# Patient Record
Sex: FEMALE | Race: WHITE | NOT HISPANIC OR LATINO | Employment: OTHER | URBAN - METROPOLITAN AREA
[De-identification: names, ages, dates, MRNs, and addresses within clinical notes are randomized per-mention and may not be internally consistent; named-entity substitution may affect disease eponyms.]

---

## 2017-02-01 ENCOUNTER — OFFICE VISIT (OUTPATIENT)
Dept: FAMILY MEDICINE CLINIC | Facility: CLINIC | Age: 57
End: 2017-02-01

## 2017-02-01 VITALS
WEIGHT: 134 LBS | OXYGEN SATURATION: 97 % | SYSTOLIC BLOOD PRESSURE: 120 MMHG | TEMPERATURE: 98.5 F | HEART RATE: 59 BPM | RESPIRATION RATE: 16 BRPM | BODY MASS INDEX: 22.33 KG/M2 | DIASTOLIC BLOOD PRESSURE: 80 MMHG | HEIGHT: 65 IN

## 2017-02-01 DIAGNOSIS — N30.01 ACUTE CYSTITIS WITH HEMATURIA: Primary | ICD-10-CM

## 2017-02-01 LAB
BILIRUB BLD-MCNC: NEGATIVE MG/DL
CLARITY, POC: CLEAR
COLOR UR: ABNORMAL
GLUCOSE UR STRIP-MCNC: NEGATIVE MG/DL
KETONES UR QL: NEGATIVE
LEUKOCYTE EST, POC: ABNORMAL
NITRITE UR-MCNC: NEGATIVE MG/ML
PH UR: 7 [PH] (ref 5–8)
PROT UR STRIP-MCNC: ABNORMAL MG/DL
RBC # UR STRIP: ABNORMAL /UL
SP GR UR: 1 (ref 1–1.03)
UROBILINOGEN UR QL: NORMAL

## 2017-02-01 PROCEDURE — 99213 OFFICE O/P EST LOW 20 MIN: CPT | Performed by: FAMILY MEDICINE

## 2017-02-01 PROCEDURE — 81003 URINALYSIS AUTO W/O SCOPE: CPT | Performed by: FAMILY MEDICINE

## 2017-02-01 RX ORDER — SULFAMETHOXAZOLE AND TRIMETHOPRIM 800; 160 MG/1; MG/1
1 TABLET ORAL 2 TIMES DAILY
Qty: 10 TABLET | Refills: 0 | Status: SHIPPED | OUTPATIENT
Start: 2017-02-01 | End: 2017-03-27

## 2017-02-01 NOTE — PROGRESS NOTES
Subjective   Kasia Carroll is a 56 y.o. female presenting with   Chief Complaint   Patient presents with   • Burning with Urination     pressure feeling with urination         HPI Comments: She noticed a little burning on urination Monday but then as the next 2 days went by she developed increasing pressure sensation and urgency with burning.    She does not have any back pain or high fever or chills or dizziness or nausea or vomiting.       The following portions of the patient's history were reviewed and updated as appropriate: current medications, past family history, past medical history, past social history, past surgical history and problem list.    Review of Systems   Genitourinary: Positive for dysuria and frequency.   All other systems reviewed and are negative.      Objective   Physical Exam   Constitutional: She is oriented to person, place, and time. She appears well-developed and well-nourished. No distress.   HENT:   Head: Normocephalic and atraumatic.   Mouth/Throat: No oropharyngeal exudate.   Eyes: EOM are normal. Pupils are equal, round, and reactive to light. No scleral icterus.   Neck: Normal range of motion. Neck supple. No thyromegaly present.   Abdominal: Soft. Bowel sounds are normal. She exhibits no distension. There is no tenderness.   Musculoskeletal: Normal range of motion. She exhibits no edema or tenderness.   Lymphadenopathy:     She has no cervical adenopathy.   Neurological: She is alert and oriented to person, place, and time.   Skin: Skin is warm and dry.   Psychiatric: She has a normal mood and affect. Her behavior is normal.   Nursing note and vitals reviewed.      Assessment/Plan   Kasia was seen today for burning with urination.    Diagnoses and all orders for this visit:    Acute cystitis with hematuria    Other orders  -     sulfamethoxazole-trimethoprim (BACTRIM DS,SEPTRA DS) 800-160 MG per tablet; Take 1 tablet by mouth 2 (Two) Times a Day.                   I would like  him to return for another visit in 6 month(s)

## 2017-02-03 LAB
BACTERIA UR CULT: NORMAL
BACTERIA UR CULT: NORMAL

## 2017-02-06 ENCOUNTER — TELEPHONE (OUTPATIENT)
Dept: FAMILY MEDICINE CLINIC | Facility: CLINIC | Age: 57
End: 2017-02-06

## 2017-02-06 NOTE — TELEPHONE ENCOUNTER
"Pt called stating she feels like her UTI has not cleared up  she still feels pressure and a \"tad\" of stinging after urination she is asking what should she do   She had a large amount of leukocytes and blood in her urine on 2-1-17  She had a rx for bactrim and finished it all   "

## 2017-02-06 NOTE — TELEPHONE ENCOUNTER
Her urine culture was negative for infection so something else may be going on.  Can leave urine specimen if she wants, if she does order culture also.  Having any vaginal discharge or other symptoms?

## 2017-02-07 ENCOUNTER — CLINICAL SUPPORT (OUTPATIENT)
Dept: FAMILY MEDICINE CLINIC | Facility: CLINIC | Age: 57
End: 2017-02-07

## 2017-02-07 DIAGNOSIS — R30.9 PAINFUL URINATION: Primary | ICD-10-CM

## 2017-02-07 LAB
BILIRUB BLD-MCNC: NEGATIVE MG/DL
CLARITY, POC: CLEAR
COLOR UR: YELLOW
GLUCOSE UR STRIP-MCNC: NEGATIVE MG/DL
KETONES UR QL: NEGATIVE
LEUKOCYTE EST, POC: ABNORMAL
NITRITE UR-MCNC: NEGATIVE MG/ML
PH UR: 6 [PH] (ref 5–8)
PROT UR STRIP-MCNC: NEGATIVE MG/DL
RBC # UR STRIP: NEGATIVE /UL
SP GR UR: 1.01 (ref 1–1.03)
UROBILINOGEN UR QL: NORMAL

## 2017-02-07 PROCEDURE — 81003 URINALYSIS AUTO W/O SCOPE: CPT | Performed by: FAMILY MEDICINE

## 2017-02-09 LAB
BACTERIA UR CULT: NORMAL
BACTERIA UR CULT: NORMAL

## 2017-03-23 DIAGNOSIS — E03.8 OTHER SPECIFIED HYPOTHYROIDISM: Primary | ICD-10-CM

## 2017-03-23 DIAGNOSIS — I10 ESSENTIAL HYPERTENSION: ICD-10-CM

## 2017-03-23 LAB
ALBUMIN SERPL-MCNC: 4.8 G/DL (ref 3.5–5.2)
ALBUMIN/GLOB SERPL: 2.2 G/DL
ALP SERPL-CCNC: 51 U/L (ref 39–117)
ALT SERPL-CCNC: 30 U/L (ref 1–33)
AST SERPL-CCNC: 36 U/L (ref 1–32)
BASOPHILS # BLD AUTO: 0.02 10*3/MM3 (ref 0–0.2)
BASOPHILS NFR BLD AUTO: 0.6 % (ref 0–1.5)
BILIRUB SERPL-MCNC: 0.4 MG/DL (ref 0.1–1.2)
BUN SERPL-MCNC: 4 MG/DL (ref 6–20)
BUN/CREAT SERPL: 7 (ref 7–25)
CALCIUM SERPL-MCNC: 9.7 MG/DL (ref 8.6–10.5)
CHLORIDE SERPL-SCNC: 95 MMOL/L (ref 98–107)
CHOLEST SERPL-MCNC: 265 MG/DL (ref 0–200)
CO2 SERPL-SCNC: 26.3 MMOL/L (ref 22–29)
CREAT SERPL-MCNC: 0.57 MG/DL (ref 0.57–1)
EOSINOPHIL # BLD AUTO: 0.21 10*3/MM3 (ref 0–0.7)
EOSINOPHIL NFR BLD AUTO: 6.1 % (ref 0.3–6.2)
ERYTHROCYTE [DISTWIDTH] IN BLOOD BY AUTOMATED COUNT: 12.4 % (ref 11.7–13)
GLOBULIN SER CALC-MCNC: 2.2 GM/DL
GLUCOSE SERPL-MCNC: 83 MG/DL (ref 65–99)
HCT VFR BLD AUTO: 39.2 % (ref 35.6–45.5)
HDLC SERPL-MCNC: 93 MG/DL (ref 40–60)
HGB BLD-MCNC: 13.3 G/DL (ref 11.9–15.5)
IMM GRANULOCYTES # BLD: 0 10*3/MM3 (ref 0–0.03)
IMM GRANULOCYTES NFR BLD: 0 % (ref 0–0.5)
LDLC SERPL CALC-MCNC: 154 MG/DL (ref 0–100)
LYMPHOCYTES # BLD AUTO: 1.27 10*3/MM3 (ref 0.9–4.8)
LYMPHOCYTES NFR BLD AUTO: 36.6 % (ref 19.6–45.3)
MCH RBC QN AUTO: 33.3 PG (ref 26.9–32)
MCHC RBC AUTO-ENTMCNC: 33.9 G/DL (ref 32.4–36.3)
MCV RBC AUTO: 98 FL (ref 80.5–98.2)
MONOCYTES # BLD AUTO: 0.41 10*3/MM3 (ref 0.2–1.2)
MONOCYTES NFR BLD AUTO: 11.8 % (ref 5–12)
NEUTROPHILS # BLD AUTO: 1.56 10*3/MM3 (ref 1.9–8.1)
NEUTROPHILS NFR BLD AUTO: 44.9 % (ref 42.7–76)
PLATELET # BLD AUTO: 225 10*3/MM3 (ref 140–500)
POTASSIUM SERPL-SCNC: 4 MMOL/L (ref 3.5–5.2)
PROT SERPL-MCNC: 7 G/DL (ref 6–8.5)
RBC # BLD AUTO: 4 10*6/MM3 (ref 3.9–5.2)
SODIUM SERPL-SCNC: 138 MMOL/L (ref 136–145)
TRIGL SERPL-MCNC: 88 MG/DL (ref 0–150)
TSH SERPL DL<=0.005 MIU/L-ACNC: 3.14 MIU/ML (ref 0.27–4.2)
VLDLC SERPL CALC-MCNC: 17.6 MG/DL (ref 5–40)
WBC # BLD AUTO: 3.47 10*3/MM3 (ref 4.5–10.7)

## 2017-03-23 RX ORDER — LEVOTHYROXINE SODIUM 0.07 MG/1
TABLET ORAL
Qty: 90 TABLET | Refills: 0 | Status: SHIPPED | OUTPATIENT
Start: 2017-03-23 | End: 2017-03-27 | Stop reason: SDUPTHER

## 2017-03-23 RX ORDER — AMLODIPINE BESYLATE 5 MG/1
TABLET ORAL
Qty: 90 TABLET | Refills: 0 | Status: SHIPPED | OUTPATIENT
Start: 2017-03-23 | End: 2017-03-27 | Stop reason: SDUPTHER

## 2017-03-27 ENCOUNTER — OFFICE VISIT (OUTPATIENT)
Dept: FAMILY MEDICINE CLINIC | Facility: CLINIC | Age: 57
End: 2017-03-27

## 2017-03-27 VITALS
OXYGEN SATURATION: 97 % | RESPIRATION RATE: 16 BRPM | HEIGHT: 65 IN | BODY MASS INDEX: 22.99 KG/M2 | DIASTOLIC BLOOD PRESSURE: 82 MMHG | WEIGHT: 138 LBS | HEART RATE: 67 BPM | SYSTOLIC BLOOD PRESSURE: 124 MMHG

## 2017-03-27 DIAGNOSIS — E78.2 MIXED HYPERLIPIDEMIA: ICD-10-CM

## 2017-03-27 DIAGNOSIS — I10 ESSENTIAL HYPERTENSION: Primary | ICD-10-CM

## 2017-03-27 DIAGNOSIS — E03.9 HYPOTHYROIDISM (ACQUIRED): ICD-10-CM

## 2017-03-27 PROCEDURE — 99213 OFFICE O/P EST LOW 20 MIN: CPT | Performed by: FAMILY MEDICINE

## 2017-03-27 RX ORDER — LEVOTHYROXINE SODIUM 0.07 MG/1
75 TABLET ORAL DAILY
Qty: 90 TABLET | Refills: 1 | Status: SHIPPED | OUTPATIENT
Start: 2017-03-27 | End: 2017-12-28 | Stop reason: SDUPTHER

## 2017-03-27 RX ORDER — AMLODIPINE BESYLATE 5 MG/1
5 TABLET ORAL DAILY
Qty: 90 TABLET | Refills: 1 | Status: SHIPPED | OUTPATIENT
Start: 2017-03-27 | End: 2017-12-28 | Stop reason: SDUPTHER

## 2017-03-27 NOTE — PROGRESS NOTES
Subjective   Kasia Carroll is a 56 y.o. female presenting with   Chief Complaint   Patient presents with   • Hypertension     check up    • Hypothyroidism     check up    • Labs Only     results from 3-23-17        HPI Comments: 56-year-old  white female nonsmoker here for follow-up for her blood pressure and cholesterol.  She also has a history of acquired hypothyroidism but her TSH is normal so she is on the right dose.  She tells me she is feeling well but she and her  have started eating out more often and instead of vinegar and orally old dressing on her salad she has been getting blue cheese.  Consequently her LDL cholesterol has risen from 122 to 150.  She has a very high HDL at 93 so I told her rather than start her on a cholesterol medicine, I would rather see her try to alter her lifestyle so that she can get the LDL down like it was before    She exercises regularly 5 days a week and 2 days a week does strength training with a .    Hypertension     Hypothyroidism          The following portions of the patient's history were reviewed and updated as appropriate: current medications, past family history, past medical history, past social history, past surgical history and problem list.    Review of Systems   All other systems reviewed and are negative.      Objective   Physical Exam   Constitutional: She is oriented to person, place, and time. She appears well-developed and well-nourished. No distress.   HENT:   Head: Normocephalic and atraumatic.   Eyes: EOM are normal. Pupils are equal, round, and reactive to light.   Neck: Normal range of motion. Neck supple. No thyromegaly present.   Cardiovascular: Normal rate, regular rhythm, normal heart sounds and intact distal pulses.  Exam reveals no friction rub.    No murmur heard.  Pulmonary/Chest: Effort normal and breath sounds normal. No respiratory distress. She has no wheezes. She has no rales.   Musculoskeletal: Normal range  of motion. She exhibits no edema or tenderness.   Lymphadenopathy:     She has no cervical adenopathy.   Neurological: She is alert and oriented to person, place, and time. No cranial nerve deficit. Coordination normal.   Skin: Skin is warm and dry. No rash noted. She is not diaphoretic.   Psychiatric: She has a normal mood and affect. Her behavior is normal.   Vitals reviewed.      Assessment/Plan   Kasia was seen today for hypertension, hypothyroidism and labs only.    Diagnoses and all orders for this visit:    Essential hypertension    Hypothyroidism (acquired)    Mixed hyperlipidemia                   I would like him to return for another visit in 6 month(s)

## 2017-03-27 NOTE — PATIENT INSTRUCTIONS
This is a very nice 56-year-old is here for follow-up.  I would like her to work on her diet to see if she can get her LDL cholesterol back down like it was last year.

## 2017-05-12 ENCOUNTER — CLINICAL SUPPORT (OUTPATIENT)
Dept: FAMILY MEDICINE CLINIC | Facility: CLINIC | Age: 57
End: 2017-05-12

## 2017-05-12 DIAGNOSIS — R35.0 URINARY FREQUENCY: Primary | ICD-10-CM

## 2017-05-12 LAB
BILIRUB BLD-MCNC: NEGATIVE MG/DL
CLARITY, POC: CLEAR
COLOR UR: YELLOW
GLUCOSE UR STRIP-MCNC: NEGATIVE MG/DL
KETONES UR QL: NEGATIVE
LEUKOCYTE EST, POC: NEGATIVE
NITRITE UR-MCNC: NEGATIVE MG/ML
PH UR: 6 [PH] (ref 5–8)
PROT UR STRIP-MCNC: NEGATIVE MG/DL
RBC # UR STRIP: NEGATIVE /UL
SP GR UR: 1.01 (ref 1–1.03)
UROBILINOGEN UR QL: NORMAL

## 2017-05-12 PROCEDURE — 81003 URINALYSIS AUTO W/O SCOPE: CPT | Performed by: FAMILY MEDICINE

## 2017-05-15 ENCOUNTER — OFFICE VISIT (OUTPATIENT)
Dept: FAMILY MEDICINE CLINIC | Facility: CLINIC | Age: 57
End: 2017-05-15

## 2017-05-15 VITALS
WEIGHT: 139 LBS | TEMPERATURE: 98.1 F | HEIGHT: 65 IN | SYSTOLIC BLOOD PRESSURE: 130 MMHG | BODY MASS INDEX: 23.16 KG/M2 | HEART RATE: 93 BPM | OXYGEN SATURATION: 98 % | RESPIRATION RATE: 16 BRPM | DIASTOLIC BLOOD PRESSURE: 82 MMHG

## 2017-05-15 DIAGNOSIS — I10 ESSENTIAL HYPERTENSION: ICD-10-CM

## 2017-05-15 DIAGNOSIS — E78.2 MIXED HYPERLIPIDEMIA: ICD-10-CM

## 2017-05-15 DIAGNOSIS — M25.571 ACUTE RIGHT ANKLE PAIN: ICD-10-CM

## 2017-05-15 DIAGNOSIS — N30.01 ACUTE CYSTITIS WITH HEMATURIA: Primary | ICD-10-CM

## 2017-05-15 LAB
BILIRUB BLD-MCNC: NEGATIVE MG/DL
CLARITY, POC: CLEAR
COLOR UR: YELLOW
GLUCOSE UR STRIP-MCNC: NEGATIVE MG/DL
KETONES UR QL: NEGATIVE
LEUKOCYTE EST, POC: NEGATIVE
NITRITE UR-MCNC: NEGATIVE MG/ML
PH UR: 5.5 [PH] (ref 5–8)
PROT UR STRIP-MCNC: NEGATIVE MG/DL
RBC # UR STRIP: ABNORMAL /UL
SP GR UR: 1 (ref 1–1.03)
UROBILINOGEN UR QL: NORMAL

## 2017-05-15 PROCEDURE — 81003 URINALYSIS AUTO W/O SCOPE: CPT | Performed by: FAMILY MEDICINE

## 2017-05-15 PROCEDURE — 73610 X-RAY EXAM OF ANKLE: CPT | Performed by: FAMILY MEDICINE

## 2017-05-15 PROCEDURE — 99214 OFFICE O/P EST MOD 30 MIN: CPT | Performed by: FAMILY MEDICINE

## 2017-05-15 RX ORDER — FLUCONAZOLE 150 MG/1
150 TABLET ORAL ONCE
Qty: 1 TABLET | Refills: 1 | Status: SHIPPED | OUTPATIENT
Start: 2017-05-15 | End: 2017-05-15

## 2017-05-16 ENCOUNTER — TELEPHONE (OUTPATIENT)
Dept: ORTHOPEDIC SURGERY | Facility: CLINIC | Age: 57
End: 2017-05-16

## 2017-05-18 ENCOUNTER — OFFICE VISIT (OUTPATIENT)
Dept: ORTHOPEDIC SURGERY | Facility: CLINIC | Age: 57
End: 2017-05-18

## 2017-05-18 VITALS — TEMPERATURE: 98 F | HEIGHT: 65 IN | WEIGHT: 139 LBS | BODY MASS INDEX: 23.16 KG/M2

## 2017-05-18 DIAGNOSIS — S93.401A SPRAIN OF RIGHT ANKLE, UNSPECIFIED LIGAMENT, INITIAL ENCOUNTER: Primary | ICD-10-CM

## 2017-05-18 DIAGNOSIS — S82.61XA CLOSED DISPLACED FRACTURE OF LATERAL MALLEOLUS OF RIGHT FIBULA, INITIAL ENCOUNTER: ICD-10-CM

## 2017-05-18 DIAGNOSIS — S86.301A PERONEAL TENDON INJURY, RIGHT, INITIAL ENCOUNTER: ICD-10-CM

## 2017-05-18 PROBLEM — S86.309A PERONEAL TENDON INJURY: Status: ACTIVE | Noted: 2017-05-18

## 2017-05-18 PROCEDURE — 99204 OFFICE O/P NEW MOD 45 MIN: CPT | Performed by: ORTHOPAEDIC SURGERY

## 2017-05-18 PROCEDURE — 27786 TREATMENT OF ANKLE FRACTURE: CPT | Performed by: ORTHOPAEDIC SURGERY

## 2017-05-22 DIAGNOSIS — S86.301A PERONEAL TENDON INJURY, RIGHT, INITIAL ENCOUNTER: Primary | ICD-10-CM

## 2017-05-23 ENCOUNTER — OFFICE VISIT (OUTPATIENT)
Dept: ORTHOPEDIC SURGERY | Facility: CLINIC | Age: 57
End: 2017-05-23

## 2017-05-23 DIAGNOSIS — S86.301A PERONEAL TENDON INJURY, RIGHT, INITIAL ENCOUNTER: ICD-10-CM

## 2017-05-23 PROCEDURE — 73721 MRI JNT OF LWR EXTRE W/O DYE: CPT | Performed by: ORTHOPAEDIC SURGERY

## 2017-05-31 ENCOUNTER — OFFICE VISIT (OUTPATIENT)
Dept: FAMILY MEDICINE CLINIC | Facility: CLINIC | Age: 57
End: 2017-05-31

## 2017-05-31 VITALS
OXYGEN SATURATION: 98 % | SYSTOLIC BLOOD PRESSURE: 120 MMHG | BODY MASS INDEX: 23.16 KG/M2 | TEMPERATURE: 97.9 F | HEART RATE: 78 BPM | HEIGHT: 65 IN | DIASTOLIC BLOOD PRESSURE: 80 MMHG | WEIGHT: 139 LBS

## 2017-05-31 DIAGNOSIS — N30.01 ACUTE CYSTITIS WITH HEMATURIA: Primary | ICD-10-CM

## 2017-05-31 LAB
BILIRUB BLD-MCNC: NEGATIVE MG/DL
CLARITY, POC: ABNORMAL
COLOR UR: ABNORMAL
GLUCOSE UR STRIP-MCNC: NEGATIVE MG/DL
KETONES UR QL: NEGATIVE
LEUKOCYTE EST, POC: ABNORMAL
NITRITE UR-MCNC: NEGATIVE MG/ML
PH UR: 8 [PH] (ref 5–8)
PROT UR STRIP-MCNC: ABNORMAL MG/DL
RBC # UR STRIP: ABNORMAL /UL
SP GR UR: 1.01 (ref 1–1.03)
UROBILINOGEN UR QL: NORMAL

## 2017-05-31 PROCEDURE — 99213 OFFICE O/P EST LOW 20 MIN: CPT | Performed by: FAMILY MEDICINE

## 2017-05-31 PROCEDURE — 81003 URINALYSIS AUTO W/O SCOPE: CPT | Performed by: FAMILY MEDICINE

## 2017-05-31 RX ORDER — SULFAMETHOXAZOLE AND TRIMETHOPRIM 800; 160 MG/1; MG/1
1 TABLET ORAL 2 TIMES DAILY
Qty: 14 TABLET | Refills: 0 | Status: SHIPPED | OUTPATIENT
Start: 2017-05-31 | End: 2017-06-30

## 2017-06-02 ENCOUNTER — OFFICE VISIT (OUTPATIENT)
Dept: ORTHOPEDIC SURGERY | Facility: CLINIC | Age: 57
End: 2017-06-02

## 2017-06-02 VITALS — HEIGHT: 64 IN | WEIGHT: 135 LBS | TEMPERATURE: 98.4 F | BODY MASS INDEX: 23.05 KG/M2

## 2017-06-02 DIAGNOSIS — S86.301D PERONEAL TENDON INJURY, RIGHT, SUBSEQUENT ENCOUNTER: ICD-10-CM

## 2017-06-02 DIAGNOSIS — S93.401D SPRAIN OF RIGHT ANKLE, UNSPECIFIED LIGAMENT, SUBSEQUENT ENCOUNTER: Primary | ICD-10-CM

## 2017-06-02 PROCEDURE — 99024 POSTOP FOLLOW-UP VISIT: CPT | Performed by: ORTHOPAEDIC SURGERY

## 2017-06-02 NOTE — PROGRESS NOTES
"Ankle Follow Up      Patient: Kasia Carroll    YOB: 1960 57 y.o. female    Chief Complaints: Ankle feeling better    History of Present Illness: Follows up injury that occurred to her right ankle on 4/27/17.  Since I saw her on 5/18/17 she's been wearing her ASO brace and has only minimal complaints of mild achy pain in the lateral aspect of the right ankle.  She's not had any feelings of instability and feels like her swelling has improved.  Prior to that she was not having episodes of instability to the ankle but had several prior sprains in the remote past  HPI    ROS: ankle pain  Past Medical History:   Diagnosis Date   • History of postmenopausal HRT 4020-1621   • Hypertension    • Hypothyroidism    • Silicone leakage from breast implant 2008       Physical Exam:   Vitals:    06/02/17 1435   Temp: 98.4 °F (36.9 °C)   Weight: 135 lb (61.2 kg)   Height: 64\" (162.6 cm)     Well developed with normal mood.Nonantalgic gait.  Right ankle shows only mild swelling laterally.  There is minimal discomfort along the peroneal tendons she's able to actively peter this with 5 out of 5 motor strength with no palpable subluxation.  Slight increased anterior drawer on the right compared to the left but no sulcus.  Nontender medially      Radiology: MRI films and report of the right ankle dated 5/23/17 reviewed shows ATFL disruption with no normal-appearing fibers and also appears that the CFL is avulsed from its fibular attachment.  Peroneal tendon sheath demonstrates effusion but the tendons appeared normal in signal thickness and position was some edema around the superior peroneal retinaculum but does not appear to be frankly disrupted.      Assessment/Plan: Right ankle sprain with peroneal tenosynovitis with possible SPR injury.  We'll area fractures noted on previous x-ray.  Clinically she is doing quite well not having feelings of subluxation of peroneal tendons or cannot provoke this today.  Overall she " is improving I would not recommend surgical treatment for this.  She understands if she develops feelings of instability or demonstrates any peroneal problems may require surgery in the future.  She'll continue with her ASO for the next month she may do some light workout activities with her brace on but no running jumping or cutting or treadmill.  Ministate upper body exercises no planks.  We'll get her into physical therapy upstairs and I will see her back in 4 weeks with x-rays of her right ankle

## 2017-06-04 LAB
BACTERIA UR CULT: ABNORMAL
BACTERIA UR CULT: ABNORMAL
OTHER ANTIBIOTIC SUSC ISLT: ABNORMAL

## 2017-06-12 ENCOUNTER — HOSPITAL ENCOUNTER (OUTPATIENT)
Dept: PHYSICAL THERAPY | Facility: HOSPITAL | Age: 57
Setting detail: THERAPIES SERIES
Discharge: HOME | End: 2017-06-12
Attending: ORTHOPAEDIC SURGERY

## 2017-06-12 DIAGNOSIS — S93.491D: ICD-10-CM

## 2017-06-12 DIAGNOSIS — S93.411D SPRAIN OF CALCANEOFIBULAR LIGAMENT OF RIGHT ANKLE, SUBSEQUENT ENCOUNTER: Primary | ICD-10-CM

## 2017-06-12 PROCEDURE — 97110 THERAPEUTIC EXERCISES: CPT | Performed by: PHYSICAL THERAPIST

## 2017-06-12 PROCEDURE — 97161 PT EVAL LOW COMPLEX 20 MIN: CPT | Performed by: PHYSICAL THERAPIST

## 2017-06-12 NOTE — THERAPY EVALUATION
Outpatient Physical Therapy Ortho Initial Evaluation  [facility]     Patient Name: Kasia Carroll  : 1960  MRN: [id number]  Today's Date: 2017      Visit Date: 2017    Patient Active Problem List   Diagnosis   • Abnormal CAT scan   • Breast calcification seen on mammogram   • Esophagitis, reflux   • Essential hypertension   • Hypothyroidism (acquired)   • Acute cystitis with hematuria   • Mixed hyperlipidemia   • Acute right ankle pain   • Sprain of right ankle   • Peroneal tendon injury   • Closed displaced fracture of lateral malleolus of right fibula        Past Medical History:   Diagnosis Date   • History of postmenopausal HRT 8399-7725   • Hypertension    • Hypothyroidism    • Silicone leakage from breast implant         Past Surgical History:   Procedure Laterality Date   • BREAST AUGMENTATION  1989, redone due to leakage.   • BREAST CAPSULOTOMY, IMPLANT REVISION     • BREAST EXCISIONAL BIOPSY Right     Dr. Bentley Patino, benign   • COSMETIC SURGERY      eyelids lifted/forehead   • TOE AMPUTATION Left        Visit Dx:     ICD-10-CM ICD-9-CM   1. Sprain of calcaneofibular ligament of right ankle, subsequent encounter S93.411D V58.89     845.02   2. Tear of talofibular ligament, right, subsequent encounter S93.491D V58.89     845.09             Patient History       17 1400          History    Chief Complaint Difficulty Walking;Difficulty with daily activities;Joint swelling;Muscle tenderness;Pain  -      Type of Pain Ankle pain  -      Date Current Problem(s) Began 17  -      Brief Description of Current Complaint Pt was walking and rolled her R ankle on a curb. She had a lot of swelling at the time and was icing, got a brace. She was on her anniversary trip so she just kept walking and would elevate it. She has been in the brace and the pain has been pretty good. She is in an ASO and has returned to working out. MRI shows ligament  disruption.   -      Onset Date- PT 6/12/17  -      Patient/Caregiver Goals Relieve pain;Return to prior level of function;Improve mobility;Improve strength  -      Occupation/sports/leisure activities retired  -      What clinical tests have you had for this problem? MRI  -      Pain     What Performance Factors Make the Current Problem(s) WORSE? stairs, sitting too long (some soreness), on uneven ground  -      What Performance Factors Make the Current Problem(s) BETTER? elevate, advil,   -      Difficulties with ADL's? stairs  -      Difficulties with recreational activities? working out  -      Fall Risk Assessment    Any falls in the past year: No  -      Services    Prior Rehab/Home Health Experiences No  -      Daily Activities    Primary Language English  -      Teaching needs identified Home Exercise Program;Management of Condition  -      Patient is concerned about/has problems with Climbing Stairs;Performing home management (household chores, shopping, care of dependents);Performing sports, recreation, and play activities  -      Pt Participated in POC and Goals Yes  -      Safety    Are you being hurt, hit, or frightened by anyone at home or in your life? No  -      Are you being neglected by a caregiver No  -        User Key  (r) = Recorded By, (t) = Taken By, (c) = Cosigned By    Initials Name Provider Type     Kandi Sutton PT Physical Therapist                PT Ortho       06/12/17 1500    Posture/Observations    Observations Edema  -LH    Left Ankle    Dorsiflexion ROM Details 10 deg  -LH    Plantarflexion ROM Details 65 deg  -LH    Inversion ROM Details 50 deg  -LH    Eversion ROM Details 30 deg  -LH    Right Ankle    Dorsiflexion ROM Details 1 deg  -LH    Plantarflexion ROM Details 50 deg  -LH    Inversion ROM Details 50 deg  -LH    Eversion ROM Details 20 deg  -LH    Left Ankle/Foot    Ankle PF Gross Movement (5/5) normal  -LH    Ankle Dorsiflexion Gross  Movement (5/5) normal  -LH    Subtalar Inversion Gross Movement (5/5) normal  -LH    Subtalar Eversion Gross Movement (5/5) normal  -LH    Right Ankle/Foot    Ankle PF Gross Movement (4/5) good  -LH    Ankle Dorsiflexion Gross Movement (4/5) good  -LH    Subtalar Inversion Gross Movement (4-/5) good minus  -LH    Subtalar Eversion Gross Movement (4-/5) good minus  -LH    Ankle Girth    Figure 8- Right 50.5  -LH    Figure 8- Left 49.3  -LH    Above Malleolus- Right 25.1  -LH    Above Malleolus- Left 25  -LH    Balance Skills Training    SLS L=>15 sec, R=5-7 sec with compensation  -    Gait Assessment/Treatment    Gait, Comment mild antalgia on level surface in ASO brace  -      User Key  (r) = Recorded By, (t) = Taken By, (c) = Cosigned By    Initials Name Provider Type     Kandi Sutton PT Physical Therapist                            Therapy Education       06/12/17 1608          Therapy Education    Given HEP;Symptoms/condition management;Pain management  -      Program New  -LH      How Provided Verbal;Demonstration;Written  -      Provided to Patient  -      Level of Understanding Teach back education performed;Verbalized;Demonstrated  -        User Key  (r) = Recorded By, (t) = Taken By, (c) = Cosigned By    Initials Name Provider Type     Kandi Sutton PT Physical Therapist                PT OP Goals       06/12/17 1600       PT Short Term Goals    STG 1 Patient will be independent with education for symptom management, joint protection and strategies to minimize stress on affected tissues  -     STG 1 Progress New  [facility]     STG 2 Pt to improve R ankle ROM in DF=6 deg, PF=60 deg, and ev=30 deg  -     STG 2 Progress [facility]     Long Term Goals    LTG 1 Pt to improve R ankle ROM in DF=10 deg for improved gait pattern  -     LTG 1 Progress New  [facility]     LTG 2 Pt to improve ankle strength to 4+ to 5  -     LTG 2 Progress [facility]     LTG 3 Pt to improve SLS to 15 seconds for improved  balance and stability  -     LTG 3 Progress New  -     LTG 4 Patient will negotiate stairs reciprocally with rail support as needed  -     LTG 4 Progress New  -     LTG 5 Pt to improve FAAM by 10% for overall improvement  -     LTG 5 Progress New  -     LTG 6 Pt to be independent with HEP for ROM, strength, and balance activities.  -     LTG 6 Progress New  -     Time Calculation    PT Goal Re-Cert Due Date 07/12/17  -       User Key  (r) = Recorded By, (t) = Taken By, (c) = Cosigned By    Initials Name Provider Type     Kandi Sutton, PT Physical Therapist                PT Assessment/Plan       06/12/17 0434       PT Assessment    Functional Limitations Limitations in community activities;Impaired gait;Limitations in functional capacity and performance;Performance in leisure activities  -     Impairments Balance;Gait;Edema;Joint mobility;Pain;Muscle strength;Range of motion  -     Assessment Comments Pt presents to therapy s/p R ankle sprain on 4/28/17. She was walking and rolled it on a curb. She has been in an ASO type brace since that time. She is ambulating with mild antalgia with dec stance time on the R LE. She has decreased ROM in DF=1 deg, PF=50 deg, inv=50 deg, and ev=20 deg, decreased ankle strengt, and decreased flexiblity of the gastroc/soleus complex. Pt scored a 87%on the FAAM, with 100% being no disability. Pt would benefit from therapy to her her ipmrove her balance, ROM, and strength to improve function.   -     Please refer to paper survey for additional self-reported information Yes  -     Rehab Potential Excellent  -     Patient/caregiver participated in establishment of treatment plan and goals Yes  -     Patient would benefit from skilled therapy intervention Yes  -     PT Plan    PT Frequency 2x/week  -     Predicted Duration of Therapy Intervention (days/wks) 3-4 weeks  -     Planned CPT's? PT EVAL LOW COMPLEXITY: 30080;PT THER PROC EA 15 MIN:  83541;PT HOT/COLD PACK WC NONMCARE: 43741;PT GAIT TRAINING EA 15 MIN: 61516;PT NEUROMUSC RE-EDUCATION EA 15 MIN: 57016;PT MANUAL THERAPY EA 15 MIN: 22971  -     Physical Therapy Interventions (Optional Details) ROM (Range of Motion);stair training;manual therapy techniques;strengthening;modalities;stretching;home exercise program;patient/family education;gait training;balance training  -     PT Plan Comments plan to see pt 2x week for ROM, strength, and balance training  -       User Key  (r) = Recorded By, (t) = Taken By, (c) = Cosigned By    Initials Name Provider Type     Kandi Sutton, [facility] Physical Therapist                  Exercises       06/12/17 1600          Exercise 1    Exercise Name 1 see chart for exercises performed  -        User Key  (r) = Recorded By, (t) = Taken By, (c) = Cosigned By    Initials Name Provider Type     Kandi Sutton, PT Physical Therapist                              Outcome Measures       06/12/17 1600          [facility]  -      Functional Assessment    Outcome Measure Options [facility]        User Key  (r) = Recorded By, (t) = Taken By, (c) = Cosigned By    Initials Name Provider Type     Kandi Sutton, PT Physical Therapist            Time Calculation:   Start Time: 1503  Stop Time: 1550  Time Calculation (min): 47 min     Therapy Charges for Today     Code Description Service Date Service Provider Modifiers Qty    32607132023  PT EVAL LOW COMPLEXITY 2 6/12/2017 Kandi Sutton, PT GP 1    [id number]  PT THER PROC EA 15 MIN 6/12/2017 Kandi Sutton, PT GP 1          PT G-Codes  Outcome Measure Options: FAAM         Kandi Sutton PT  6/12/2017

## 2017-06-14 ENCOUNTER — HOSPITAL ENCOUNTER (OUTPATIENT)
Dept: PHYSICAL THERAPY | Facility: HOSPITAL | Age: 57
Setting detail: THERAPIES SERIES
Discharge: HOME | End: 2017-06-14

## 2017-06-14 DIAGNOSIS — S93.411D SPRAIN OF CALCANEOFIBULAR LIGAMENT OF RIGHT ANKLE, SUBSEQUENT ENCOUNTER: Primary | ICD-10-CM

## 2017-06-14 DIAGNOSIS — S93.491D: ICD-10-CM

## 2017-06-14 PROCEDURE — 97110 THERAPEUTIC EXERCISES: CPT

## 2017-06-14 NOTE — THERAPY TREATMENT NOTE
Outpatient Physical Therapy Ortho Treatment Note  [facility]     Patient Name: Kasia Carroll  : 1960  MRN: [id number]  Today's Date: 2017      Visit Date: 2017    Visit Dx:    ICD-10-CM ICD-9-CM   1. Sprain of calcaneofibular ligament of right ankle, subsequent encounter S93.411D V58.89     845.02   2. Tear of talofibular ligament, right, subsequent encounter S93.491D V58.89     845.09       Patient Active Problem List   Diagnosis   • Abnormal CAT scan   • Breast calcification seen on mammogram   • Esophagitis, reflux   • Essential hypertension   • Hypothyroidism (acquired)   • Acute cystitis with hematuria   • Mixed hyperlipidemia   • Acute right ankle pain   • Sprain of right ankle   • Peroneal tendon injury   • Closed displaced fracture of lateral malleolus of right fibula        Past Medical History:   Diagnosis Date   • History of postmenopausal HRT 8854-9361   • Hypertension    • Hypothyroidism    • Silicone leakage from breast implant         Past Surgical History:   Procedure Laterality Date   • BREAST AUGMENTATION  1989, redone due to leakage.   • BREAST CAPSULOTOMY, IMPLANT REVISION     • BREAST EXCISIONAL BIOPSY Right     Dr. Bentley Patino, benign   • COSMETIC SURGERY      eyelids lifted/forehead   • TOE AMPUTATION Left 1964             PT Ortho       17 1100    Subjective Comments    Subjective Comments Minimal c/o pain or instability wearing ASO.  Mild swelling.  -SI    Balance Skills Training    SLS left 15 right 7 at best  -SI      17 1500    Posture/Observations    Observations Edema  -LH    Left Ankle    Dorsiflexion ROM Details 10 deg  -LH    Plantarflexion ROM Details 65 deg  -LH    Inversion ROM Details 50 deg  -LH    Eversion ROM Details 30 deg  -LH    Right Ankle    Dorsiflexion ROM Details 1 deg  -LH    Plantarflexion ROM Details 50 deg  -LH    Inversion ROM Details 50 deg  -LH    Eversion ROM Details 20 deg  -LH    Left  Ankle/Foot    Ankle PF Gross Movement (5/5) normal  -LH    Ankle Dorsiflexion Gross Movement (5/5) normal  -LH    Subtalar Inversion Gross Movement (5/5) normal  -LH    Subtalar Eversion Gross Movement (5/5) normal  -LH    Right Ankle/Foot    Ankle PF Gross Movement (4/5) good  -LH    Ankle Dorsiflexion Gross Movement (4/5) good  -LH    Subtalar Inversion Gross Movement (4-/5) good minus  -LH    Subtalar Eversion Gross Movement (4-/5) good minus  -LH    Ankle Girth    Figure 8- Right 50.5  -LH    Figure 8- Left 49.3  -LH    Above Malleolus- Right 25.1  -LH    Above Malleolus- Left 25  -LH    Balance Skills Training    SLS L=>15 sec, R=5-7 sec with compensation  -    Gait Assessment/Treatment    Gait, Comment mild antalgia on level surface in ASO brace  -      User Key  (r) = Recorded By, (t) = Taken By, (c) = Cosigned By    Initials Name Provider Type     Kandi Sutton, PT Physical Therapist    CRUZ Whalen PTA Physical Therapy Assistant                            PT Assessment/Plan       06/14/17 1142       PT Assessment    Assessment Comments Ex needed some re-instruct to perform correctly.  Difficult for her to isolate EV when resisted so to go light.  pt states MD said could wean from ASO in 2 weeks which would be at end of week?  -SI       User Key  (r) = Recorded By, (t) = Taken By, (c) = Cosigned By    Initials Name Provider Type    CRUZ Whalen PTA Physical Therapy Assistant                Modalities       06/14/17 1100          Ice    Patient reports will apply ice at home to involved area Yes  -SI        User Key  (r) = Recorded By, (t) = Taken By, (c) = Cosigned By    Initials Name Provider Type    CRUZ Whalen PTA Physical Therapy Assistant                Exercises       06/14/17 1100          Subjective Comments    Subjective Comments Minimal c/o pain or instability wearing ASO.  Mild swelling.  -SI      Exercise 1    Exercise Name 1 see chart for exercises performed  -SI         User Key  (r) = Recorded By, (t) = Taken By, (c) = Cosigned By    Initials Name Provider Type    CRUZ Whalen PTA Physical Therapy Assistant                                   Therapy Education       06/14/17 1142          Therapy Education    Given HEP;Symptoms/condition management;Edema management  -SI      Program Reinforced  -SI      How Provided Verbal;Demonstration;Written  -SI      Provided to Patient  -SI      Level of Understanding Teach back education performed  -SI        User Key  (r) = Recorded By, (t) = Taken By, (c) = Cosigned By    Initials Name Provider Type    CRUZ Whalen PTA Physical Therapy Assistant                Outcome Measures       06/12/17 1600          [facility] 87%  -LH      Functional Assessment    Outcome Measure Options [facility]  -        User Key  (r) = Recorded By, (t) = Taken By, (c) = Cosigned By    Initials Name Provider Type     Kandi Sutton, PT Physical Therapist            Time Calculation:   Start Time: 1020  Stop Time: 1100  Time Calculation (min): 40 min    Therapy Charges for Today     Code Description Service Date Service Provider Modifiers Qty    [id number]  PT THER PROC EA 15 MIN 6/14/2017 Ruth Whalen PTA GP 3                    Ruth Whalen PTA  6/14/2017

## 2017-06-20 ENCOUNTER — HOSPITAL ENCOUNTER (OUTPATIENT)
Dept: PHYSICAL THERAPY | Facility: HOSPITAL | Age: 57
Setting detail: THERAPIES SERIES
Discharge: HOME | End: 2017-06-20

## 2017-06-20 DIAGNOSIS — S93.491D: ICD-10-CM

## 2017-06-20 DIAGNOSIS — S93.411D SPRAIN OF CALCANEOFIBULAR LIGAMENT OF RIGHT ANKLE, SUBSEQUENT ENCOUNTER: Primary | ICD-10-CM

## 2017-06-20 PROCEDURE — 97110 THERAPEUTIC EXERCISES: CPT

## 2017-06-20 NOTE — THERAPY TREATMENT NOTE
Outpatient Physical Therapy Ortho Treatment Note  [facility]     Patient Name: Kasia Carroll  : 1960  MRN: [id number]  Today's Date: 2017      Visit Date: 2017    Visit Dx:    ICD-10-CM ICD-9-CM   1. Sprain of calcaneofibular ligament of right ankle, subsequent encounter S93.411D V58.89     845.02   2. Tear of talofibular ligament, right, subsequent encounter S93.491D V58.89     845.09       Patient Active Problem List   Diagnosis   • Abnormal CAT scan   • Breast calcification seen on mammogram   • Esophagitis, reflux   • Essential hypertension   • Hypothyroidism (acquired)   • Acute cystitis with hematuria   • Mixed hyperlipidemia   • Acute right ankle pain   • Sprain of right ankle   • Peroneal tendon injury   • Closed displaced fracture of lateral malleolus of right fibula        Past Medical History:   Diagnosis Date   • History of postmenopausal HRT [id number]   • Hypertension    • Hypothyroidism    • Silicone leakage from breast implant         Past Surgical History:   Procedure Laterality Date   • BREAST AUGMENTATION  1989, redone due to leakage.   • BREAST CAPSULOTOMY, IMPLANT REVISION     • BREAST EXCISIONAL BIOPSY Right     Dr. Bentley Patino, benign   • COSMETIC SURGERY      eyelids lifted/forehead   • TOE AMPUTATION Left                              PT Assessment/Plan       17 1154       PT Assessment    Assessment Comments Pt tolerating R ankle strengthening and stabilization program well. She continues to wear brace in community and during workouts at gym. Improved eversion/inversion today with verbal cues to reduce compensation.   -LS       User Key  (r) = Recorded By, (t) = Taken By, (c) = Cosigned By    Initials Name Provider Type    LS Gauri Ann, PT Physical Therapist                    Exercises       17 1000          Subjective Comments    Subjective Comments I wear the brace still mostly all of the time. I still occasionally  forget at home but it feels fine.  -LS      Subjective Pain    Able to rate subjective pain? yes  -LS      Pre-Treatment Pain Level 0  -LS      Exercise 1    Exercise Name 1 see chart for exercise flowsheet  -LS        User Key  (r) = Recorded By, (t) = Taken By, (c) = Cosigned By    Initials Name Provider Type    SANTIAGO Ann PT Physical Therapist                               PT OP Goals       06/20/17 1100       PT Short Term Goals    STG 1 Patient will be independent with education for symptom management, joint protection and strategies to minimize stress on affected tissues  -LS     STG 1 Progress Ongoing  -LS     STG 1 Progress Comments Pt doing well managing symptoms. Has returned to workout with modifications.  -LS     STG 2 Pt to improve R ankle ROM in DF=6 deg, PF=60 deg, and ev=30 deg  -LS     STG 2 Progress Ongoing  -LS     Long Term Goals    LTG 1 Pt to improve R ankle ROM in DF=10 deg for improved gait pattern  -LS     LTG 1 Progress Ongoing  -LS     LTG 2 Pt to improve ankle strength to 4+ to 5  -LS     LTG 2 Progress Ongoing  -LS     LTG 3 Pt to improve SLS to 15 seconds for improved balance and stability  -LS     LTG 3 Progress Met  -LS     LTG 3 Progress Comments Pt demonstrating 20 sec B without UE support.   -LS     LTG 4 Patient will negotiate stairs reciprocally with rail support as needed  -LS     LTG 4 Progress Met  -LS     LTG 4 Progress Comments Pt able to ascend/descend reciprocally. Some pain occasionally.   -LS     LTG 5 Pt to improve FAAM by 10% for overall improvement  -LS     LTG 5 Progress Ongoing  -LS     LTG 6 Pt to be independent with HEP for ROM, strength, and balance activities.  -LS     LTG 6 Progress Ongoing  -LS       User Key  (r) = Recorded By, (t) = Taken By, (c) = Cosigned By    Initials Name Provider Type    SANTIAGO Ann PT Physical Therapist                Therapy Education       06/20/17 1153          Therapy Education    Given HEP  -LS      Program  Progressed  -LS      How Provided Verbal;Demonstration;Written  -LS      Provided to Patient  -LS      Level of Understanding Teach back education performed;Verbalized;Demonstrated  -LS        User Key  (r) = Recorded By, (t) = Taken By, (c) = Cosigned By    Initials Name Provider Type    SANTIAGO Ann PT Physical Therapist                Time Calculation:   Start Time: 1100  Stop Time: 1145  Time Calculation (min): 45 min    Therapy Charges for Today     Code Description Service Date Service Provider Modifiers Qty    28219437547  PT THER PROC EA 15 MIN 6/20/2017 Gauri Ann PT GP 3                    Gauri Ann PT  6/20/2017

## 2017-06-22 ENCOUNTER — HOSPITAL ENCOUNTER (OUTPATIENT)
Dept: PHYSICAL THERAPY | Facility: HOSPITAL | Age: 57
Setting detail: THERAPIES SERIES
Discharge: HOME | End: 2017-06-22

## 2017-06-22 DIAGNOSIS — S93.491D: ICD-10-CM

## 2017-06-22 DIAGNOSIS — S93.411D SPRAIN OF CALCANEOFIBULAR LIGAMENT OF RIGHT ANKLE, SUBSEQUENT ENCOUNTER: Primary | ICD-10-CM

## 2017-06-22 PROCEDURE — 97110 THERAPEUTIC EXERCISES: CPT

## 2017-06-22 NOTE — THERAPY TREATMENT NOTE
Outpatient Physical Therapy Ortho Treatment Note  [facility]     Patient Name: Kasia Carroll  : 1960  MRN: [id number]  Today's Date: 2017      Visit Date: 2017    Visit Dx:    ICD-10-CM ICD-9-CM   1. Sprain of calcaneofibular ligament of right ankle, subsequent encounter S93.411D V58.89     845.02   2. Tear of talofibular ligament, right, subsequent encounter S93.491D V58.89     845.09       Patient Active Problem List   Diagnosis   • Abnormal CAT scan   • Breast calcification seen on mammogram   • Esophagitis, reflux   • Essential hypertension   • Hypothyroidism (acquired)   • Acute cystitis with hematuria   • Mixed hyperlipidemia   • Acute right ankle pain   • Sprain of right ankle   • Peroneal tendon injury   • Closed displaced fracture of lateral malleolus of right fibula        Past Medical History:   Diagnosis Date   • History of postmenopausal HRT [id number]   • Hypertension    • Hypothyroidism    • Silicone leakage from breast implant         Past Surgical History:   Procedure Laterality Date   • BREAST AUGMENTATION  1989, redone due to leakage.   • BREAST CAPSULOTOMY, IMPLANT REVISION     • BREAST EXCISIONAL BIOPSY Right     Dr. Bentley Patino, benign   • COSMETIC SURGERY      eyelids lifted/forehead   • TOE AMPUTATION Left              PT Ortho       17 1500    Subjective Comments    Subjective Comments Wear ASO when oob, off some at home.  Pt with very little pain and swelling.  Reports no feelings of instability right lateral ankle  -SI    Subjective Pain    Able to rate subjective pain? yes  -SI    Pre-Treatment Pain Level 0  -SI    Post-Treatment Pain Level 0  -SI    Subjective Pain Comment sub-acute low level pain with resisted EV  -SI    Left Ankle    Dorsiflexion ROM Details 5  -SI    Right Ankle    Dorsiflexion ROM Details 5  -SI    Plantarflexion ROM Details 60  -SI    Inversion ROM Details 50  -SI    Eversion ROM Details 20  -SI     Right Ankle/Foot    Ankle PF Gross Movement (4+/5) good plus  -SI    Ankle Dorsiflexion Gross Movement (4+/5) good plus  -SI    Subtalar Inversion Gross Movement (4+/5) good plus  -SI    Subtalar Eversion Gross Movement (4/5) good  -SI    Balance Skills Training    SLS 20 left 15 right  -SI    Gait Assessment/Treatment    Gait, Comment normal level surfaces  -SI      User Key  (r) = Recorded By, (t) = Taken By, (c) = Cosigned By    Initials Name Provider Type    CRUZ Whalen PTA Physical Therapy Assistant                            PT Assessment/Plan       06/22/17 1536       PT Assessment    Assessment Comments Pt with 1-2 more sessions to make sure ok with HEP.  RTMD 6-30-17.  Seems to be doing well despite listed findings on MRI.  -SI       User Key  (r) = Recorded By, (t) = Taken By, (c) = Cosigned By    Initials Name Provider Type    CRUZ Whalen PTA Physical Therapy Assistant                Modalities       06/22/17 1500          Ice    Patient reports will apply ice at home to involved area Yes  -SI        User Key  (r) = Recorded By, (t) = Taken By, (c) = Cosigned By    Initials Name Provider Type    CRUZ Whalen PTA Physical Therapy Assistant                Exercises       06/22/17 1500          Subjective Comments    Subjective Comments Wear ASO when oob, off some at home.  Pt with very little pain and swelling.  Reports no feelings of instability right lateral ankle  -SI      Subjective Pain    Able to rate subjective pain? yes  -SI      Pre-Treatment Pain Level 0  -SI      Post-Treatment Pain Level 0  -SI      Subjective Pain Comment sub-acute low level pain with resisted EV  -SI      Exercise 1    Exercise Name 1 see chart for exercise flowsheet  -SI        User Key  (r) = Recorded By, (t) = Taken By, (c) = Cosigned By    Initials Name Provider Type    CRUZ Whalen PTA Physical Therapy Assistant                               PT OP Goals       06/22/17 1500       PT Short Term  Goals    STG 1 Patient will be independent with education for symptom management, joint protection and strategies to minimize stress on affected tissues  -SI     STG 1 Progress Met  -SI     STG 2 Pt to improve R ankle ROM in DF=6 deg, PF=60 deg, and ev=30 deg  -SI     STG 2 Progress Partially Met  -SI     Long Term Goals    LTG 1 Pt to improve R ankle ROM in DF=10 deg for improved gait pattern  -SI     LTG 1 Progress Ongoing  -SI     LTG 2 Pt to improve ankle strength to 4+ to 5  -SI     LTG 2 Progress Partially Met   met for all except EV  -SI       User Key  (r) = Recorded By, (t) = Taken By, (c) = Cosigned By    Initials Name Provider Type    CRUZ Whalen PTA Physical Therapy Assistant                Therapy Education       06/22/17 1535          Therapy Education    Given HEP;Symptoms/condition management  -SI      Program Progressed  -SI      How Provided Verbal;Demonstration;Written  -SI      Provided to Patient  -SI      Level of Understanding Teach back education performed  -SI        User Key  (r) = Recorded By, (t) = Taken By, (c) = Cosigned By    Initials Name Provider Type    CRUZ Whalen PTA Physical Therapy Assistant                Time Calculation:   Start Time: 1145  Stop Time: 1230  Time Calculation (min): 45 min    Therapy Charges for Today     Code Description Service Date Service Provider Modifiers Qty    22101658192 HC PT THER PROC EA 15 MIN 6/22/2017 Ruth Whalen PTA GP 3                    Ruth Whalen PTA  6/22/2017

## 2017-06-26 ENCOUNTER — HOSPITAL ENCOUNTER (OUTPATIENT)
Dept: PHYSICAL THERAPY | Facility: HOSPITAL | Age: 57
Setting detail: THERAPIES SERIES
End: 2017-06-26

## 2017-06-29 ENCOUNTER — HOSPITAL ENCOUNTER (OUTPATIENT)
Dept: PHYSICAL THERAPY | Facility: HOSPITAL | Age: 57
Setting detail: THERAPIES SERIES
Discharge: HOME | End: 2017-06-29

## 2017-06-29 DIAGNOSIS — S93.411D SPRAIN OF CALCANEOFIBULAR LIGAMENT OF RIGHT ANKLE, SUBSEQUENT ENCOUNTER: Primary | ICD-10-CM

## 2017-06-29 DIAGNOSIS — S93.491D: ICD-10-CM

## 2017-06-29 PROCEDURE — 97110 THERAPEUTIC EXERCISES: CPT | Performed by: PHYSICAL THERAPIST

## 2017-06-29 NOTE — THERAPY TREATMENT NOTE
Outpatient Physical Therapy Ortho Treatment Note  [facility]     Patient Name: Kasia Carroll  : 1960  MRN: [id number]  Today's Date: 2017      Visit Date: 2017    Visit Dx:    ICD-10-CM ICD-9-CM   1. Sprain of calcaneofibular ligament of right ankle, subsequent encounter S93.411D V58.89     845.02   2. Tear of talofibular ligament, right, subsequent encounter S93.491D V58.89     845.09       Patient Active Problem List   Diagnosis   • Abnormal CAT scan   • Breast calcification seen on mammogram   • Esophagitis, reflux   • Essential hypertension   • Hypothyroidism (acquired)   • Acute cystitis with hematuria   • Mixed hyperlipidemia   • Acute right ankle pain   • Sprain of right ankle   • Peroneal tendon injury   • Closed displaced fracture of lateral malleolus of right fibula        Past Medical History:   Diagnosis Date   • History of postmenopausal HRT 3837-3179   • Hypertension    • Hypothyroidism    • Silicone leakage from breast implant         Past Surgical History:   Procedure Laterality Date   • BREAST AUGMENTATION  1989, redone due to leakage.   • BREAST CAPSULOTOMY, IMPLANT REVISION     • BREAST EXCISIONAL BIOPSY Right     Dr. Bentley Patino, benign   • COSMETIC SURGERY      eyelids lifted/forehead   • TOE AMPUTATION Left 1964             PT Ortho       17 1100    Right Ankle    Dorsiflexion ROM Details 7  -RA    Plantarflexion ROM Details 60  -RA    Inversion ROM Details 48  -RA    Eversion ROM Details 25  -RA      User Key  (r) = Recorded By, (t) = Taken By, (c) = Cosigned By    Initials Name Provider Type    KAMILAH Mg, PT Physical Therapist                            PT Assessment/Plan       17 1145       PT Assessment    Assessment Comments Ovearll patient doing well but still very cautious with activity, no pain/difficulty wtih walking or stairs, some difficulty with SLS balance but improving.  Attempted SLS on foam but unable -  feel limitation partly due to fear/apprehension with unstable surface.  Also attempted static balance (B feet) on black side of BOSU (ball side down) but patient unable so switched to rocker board to work on static balance A/P and M/L which were challenging to patient.     -RA     PT Plan    PT Plan Comments Sees MD 6/30/17, ? continued therapy for ankle stability and balance vs D/C to independent HEP pending MD recommendation   -RA       User Key  (r) = Recorded By, (t) = Taken By, (c) = Cosigned By    Initials Name Provider Type    KAMILAH Mg PT Physical Therapist                    Exercises       06/29/17 1100          Subjective Comments    Subjective Comments Continue to be cautious - don't want to do something to aggravate/re-injure it.    -RA      Subjective Pain    Able to rate subjective pain? yes  -RA      Pre-Treatment Pain Level 0  -RA      Post-Treatment Pain Level 0  -RA      Exercise 1    Exercise Name 1 see chart for exercise flowsheet  -RA        User Key  (r) = Recorded By, (t) = Taken By, (c) = Cosigned By    Initials Name Provider Type    KAMILAH Mg PT Physical Therapist                                   Therapy Education       06/29/17 1144          Therapy Education    Given HEP;Symptoms/condition management  -RA      Program Reinforced  -RA      How Provided Verbal;Demonstration  -RA      Provided to Patient  -RA      Level of Understanding Teach back education performed  -RA        User Key  (r) = Recorded By, (t) = Taken By, (c) = Cosigned By    Initials Name Provider Type    KAMILAH Mg PT Physical Therapist                Time Calculation:   Start Time: 1145  Stop Time: 1232  Time Calculation (min): 47 min    Therapy Charges for Today     Code Description Service Date Service Provider Modifiers Qty    36628230253  PT THER PROC EA 15 MIN 6/29/2017 Rosemarie Mg PT GP 3                    Rosemarie Mg PT  6/29/2017

## 2017-06-30 ENCOUNTER — OFFICE VISIT (OUTPATIENT)
Dept: ORTHOPEDIC SURGERY | Facility: CLINIC | Age: 57
End: 2017-06-30

## 2017-06-30 VITALS — TEMPERATURE: 98.4 F | HEIGHT: 64 IN | BODY MASS INDEX: 23.05 KG/M2 | WEIGHT: 135 LBS

## 2017-06-30 DIAGNOSIS — S93.401D SPRAIN OF RIGHT ANKLE, UNSPECIFIED LIGAMENT, SUBSEQUENT ENCOUNTER: Primary | ICD-10-CM

## 2017-06-30 DIAGNOSIS — S86.301D PERONEAL TENDON INJURY, RIGHT, SUBSEQUENT ENCOUNTER: ICD-10-CM

## 2017-06-30 PROCEDURE — 73610 X-RAY EXAM OF ANKLE: CPT | Performed by: ORTHOPAEDIC SURGERY

## 2017-06-30 PROCEDURE — 99024 POSTOP FOLLOW-UP VISIT: CPT | Performed by: ORTHOPAEDIC SURGERY

## 2017-06-30 NOTE — PROGRESS NOTES
"Ankle Follow Up      Patient: Kasia Carroll    YOB: 1960 57 y.o. female    Chief Complaints: Ankle getting better    History of Present Illness: Follows up injury to her right ankle from 4/27/17.  Last seen 6/2/17.  Since then she has completed physical therapy and is now doing home exercises.  She has minimal complaints of only some mild occasional achiness in the inferolateral aspect of the right hindfoot.  She's not had feelings of instability but is been in her ASO brace the majority of the time.  HPI    ROS: ankle pain  Past Medical History:   Diagnosis Date   • History of postmenopausal HRT [id number]   • Hypertension    • Hypothyroidism    • Silicone leakage from breast implant 2008       Physical Exam:   Vitals:    06/30/17 1309   Temp: 98.4 °F (36.9 °C)   TempSrc: Temporal Artery    Weight: 135 lb (61.2 kg)   Height: 64\" (162.6 cm)     Well developed with normal mood.Nonantalgic gait.  No focal pain along the peroneal tendons in the retromalleolar area some mild discomfort to them slightly distal to this.  No focal pain or feelings of subluxation with provocative maneuvers.  Increased anterior drawer on the right compared to the left.      Radiology: 3 views of the right ankle were ordered to evaluate pain and reviewed these were compared with x-rays from her last visit.  Still see the areas of a avulsion over the distal aspect of the fibula that is been no change in alignment.      Assessment/Plan: Right ankle sprain with peroneal tenosynovitis and possible SPR injury.  Clinically she seems be doing quite well and I would not recommend surgery at this time.  Like her to wean out of the brace except for when she's going to be on uneven ground or working in the yard and we'll have to see how she does.  She'll continue with home physical therapy and I will check her back in 6 weeks' x-rays of her right ankle if she still having pain  "

## 2017-08-09 ENCOUNTER — OFFICE VISIT (OUTPATIENT)
Dept: OBSTETRICS AND GYNECOLOGY | Facility: CLINIC | Age: 57
End: 2017-08-09

## 2017-08-09 VITALS
BODY MASS INDEX: 23.56 KG/M2 | SYSTOLIC BLOOD PRESSURE: 128 MMHG | DIASTOLIC BLOOD PRESSURE: 78 MMHG | HEIGHT: 64 IN | WEIGHT: 138 LBS

## 2017-08-09 DIAGNOSIS — Z01.419 GYNECOLOGIC EXAM NORMAL: ICD-10-CM

## 2017-08-09 DIAGNOSIS — R39.9 UTI SYMPTOMS: Primary | ICD-10-CM

## 2017-08-09 LAB
BILIRUB BLD-MCNC: NEGATIVE MG/DL
CLARITY, POC: CLEAR
COLOR UR: YELLOW
GLUCOSE UR STRIP-MCNC: NEGATIVE MG/DL
KETONES UR QL: NEGATIVE
LEUKOCYTE EST, POC: NEGATIVE
NITRITE UR-MCNC: NEGATIVE MG/ML
PH UR: 7 [PH] (ref 5–8)
PROT UR STRIP-MCNC: NEGATIVE MG/DL
RBC # UR STRIP: NEGATIVE /UL
SP GR UR: 1.01 (ref 1–1.03)
UROBILINOGEN UR QL: NORMAL

## 2017-08-09 PROCEDURE — 81002 URINALYSIS NONAUTO W/O SCOPE: CPT | Performed by: NURSE PRACTITIONER

## 2017-08-09 PROCEDURE — 99386 PREV VISIT NEW AGE 40-64: CPT | Performed by: NURSE PRACTITIONER

## 2017-08-09 RX ORDER — ESTRADIOL 0.1 MG/G
1 CREAM VAGINAL NIGHTLY
Qty: 42.5 G | Refills: 2 | Status: SHIPPED | OUTPATIENT
Start: 2017-08-09 | End: 2018-09-11 | Stop reason: SDUPTHER

## 2017-08-09 NOTE — PROGRESS NOTES
Kasia Carroll is a 57 y.o. female.   Chief Complaint   Patient presents with   • Gynecologic Exam     Patient is here for annual.      HPI:pt here for annual exma,no c/o  The following portions of the patient's history were reviewed and updated as appropriate: allergies, current medications, past family history, past medical history, past social history, past surgical history and problem list.    Review of Systems  Review of Systems   Constitutional: Negative.  Negative for unexpected weight change.   Respiratory: Negative for chest tightness and shortness of breath.    Cardiovascular: Negative for chest pain and palpitations.   Gastrointestinal: Negative for abdominal pain and blood in stool.   Endocrine: Negative.    Genitourinary: Negative for dyspareunia, dysuria, frequency, hematuria, menstrual problem, pelvic pain, vaginal bleeding, vaginal discharge and vaginal pain.   Musculoskeletal: Negative for joint swelling.   Skin: Negative for color change, rash and wound.   Allergic/Immunologic: Negative.    Psychiatric/Behavioral: Negative.    All other systems reviewed and are negative.      Objective   Physical Exam   Constitutional: She is oriented to person, place, and time. She appears well-developed and well-nourished.   HENT:   Head: Normocephalic.   Neck: Normal range of motion.   Cardiovascular: Normal rate and regular rhythm.    Pulmonary/Chest: Effort normal and breath sounds normal. Right breast exhibits no mass and no nipple discharge. Left breast exhibits no mass and no nipple discharge. There is no breast swelling.   Breasts soft without palpable masses   Abdominal: Soft. Bowel sounds are normal.   Genitourinary: Vagina normal and uterus normal. There is no rash or lesion on the right labia. There is no rash or lesion on the left labia. Cervix exhibits no friability. Right adnexum displays no mass, no tenderness and no fullness. Left adnexum displays no mass, no tenderness and no fullness.    Genitourinary Comments: Ovaries  Within normal limits. Cervix  Within normal limits   Neurological: She is alert and oriented to person, place, and time.   Skin: Skin is warm and dry.   Psychiatric: She has a normal mood and affect. Her behavior is normal.   Vitals reviewed.      Assessment/Plan   Patient Instructions   Pt. Counseled today on safe sex practices, pap smear performed, self breast examinations discussed, if positive family history mammogram starting at age 35 otherwise starting at age 40. Colonoscopy recommended.  Hormone replacement therapy discussed. Aspirin prophylaxis to reduce risk of stroke.  Calcium and Vitamin D requirements discussed.  Diet and exercise also counseled.       Kasia was seen today for gynecologic exam.    Diagnoses and all orders for this visit:    UTI symptoms  -     POC Urinalysis Dipstick    Gynecologic exam normal  -     Pap IG, Rfx HPV ASCU    Other orders  -     estradiol (ESTRACE) 0.1 MG/GM vaginal cream; Insert 1 g into the vagina Every Night. Use 1 gm twice weekly        Return in about 1 year (around 8/9/2018).

## 2017-08-14 LAB
CONV .: NORMAL
CYTOLOGIST CVX/VAG CYTO: NORMAL
CYTOLOGY CVX/VAG DOC THIN PREP: NORMAL
DX ICD CODE: NORMAL
HIV 1 & 2 AB SER-IMP: NORMAL
OTHER STN SPEC: NORMAL
PATH REPORT.FINAL DX SPEC: NORMAL
STAT OF ADQ CVX/VAG CYTO-IMP: NORMAL

## 2017-08-16 ENCOUNTER — OFFICE VISIT (OUTPATIENT)
Dept: ORTHOPEDIC SURGERY | Facility: CLINIC | Age: 57
End: 2017-08-16

## 2017-08-16 VITALS — BODY MASS INDEX: 23.05 KG/M2 | HEIGHT: 64 IN | WEIGHT: 135 LBS | TEMPERATURE: 98.4 F

## 2017-08-16 DIAGNOSIS — M76.71 PERONEAL TENDONITIS OF RIGHT LOWER LEG: ICD-10-CM

## 2017-08-16 DIAGNOSIS — S93.401D SPRAIN OF RIGHT ANKLE, UNSPECIFIED LIGAMENT, SUBSEQUENT ENCOUNTER: Primary | ICD-10-CM

## 2017-08-16 PROCEDURE — 99024 POSTOP FOLLOW-UP VISIT: CPT | Performed by: ORTHOPAEDIC SURGERY

## 2017-08-16 RX ORDER — MELOXICAM 15 MG/1
TABLET ORAL
Qty: 30 TABLET | Refills: 1 | Status: SHIPPED | OUTPATIENT
Start: 2017-08-16 | End: 2017-09-14

## 2017-08-16 NOTE — PROGRESS NOTES
"Ankle Follow Up      Patient: Kasia Carroll    YOB: 1960 57 y.o. female    Chief Complaints: Ankle feeling better    History of Present Illness: Follows up injury to right ankle from 4/27/17 with ATFL and CFL injuries as well as peroneal tenosynovitis but no markus tear.  She is now weaned out of her brace and completed physical therapy but admittedly has not been compliant with home exercise program but didn't feel better when she was doing this.  She has not had feelings of instability to the ankle but reports only some occasional mild stinging pain over the inferolateral aspect of the right ankle just over the distal fibula with twisting activities.  Overall she feels improved  HPI    ROS: ankle pain  Past Medical History:   Diagnosis Date   • History of postmenopausal HRT [id number]   • Hypertension    • Hypothyroidism    • Silicone leakage from breast implant 2008       Physical Exam:   Vitals:    08/16/17 1135   Temp: 98.4 °F (36.9 °C)   Weight: 135 lb (61.2 kg)   Height: 64\" (162.6 cm)     Well developed with normal mood.Nonantalgic gait.  Right ankle showed minimal discomfort along the peroneal tendons with 5 out of 5 eversion strength without discomfort to palpation in the retromalleolar groove.  I was unable to provoke any subluxation of the peroneal tendons with resisted eversion.  With this there was some mild discomfort over the distal aspect of the fibula.  Minimal discomfort over the anterolateral ligamentous structures with very slight anterior drawer on the right compared to the left which seemed improved from previous visit.      Radiology: None performed      Assessment/Plan:  Right ankle ATFL and CFL injuries with peroneal tenosynovitis well.  Overall she feels improved and I would not recommend anything from a surgical standpoint at this time.  Curious that she has is intermittent burning pain directly over the distal fibula this may be some from residual inflammation.  She is out " of her brace now.  We'll have her use meloxicam 15 mg one by mouth daily with GI precautions #30 with 1 refill and restart her home exercise strengthening program.  I will check her back in 4 weeks' x-rays of her right ankle if she still having pain.

## 2017-09-07 ENCOUNTER — APPOINTMENT (OUTPATIENT)
Dept: WOMENS IMAGING | Facility: HOSPITAL | Age: 57
End: 2017-09-07

## 2017-09-07 PROCEDURE — G0202 SCR MAMMO BI INCL CAD: HCPCS | Performed by: RADIOLOGY

## 2017-09-07 PROCEDURE — 77067 SCR MAMMO BI INCL CAD: CPT | Performed by: RADIOLOGY

## 2017-09-07 PROCEDURE — 77063 BREAST TOMOSYNTHESIS BI: CPT | Performed by: RADIOLOGY

## 2017-09-08 ENCOUNTER — DOCUMENTATION (OUTPATIENT)
Dept: PHYSICAL THERAPY | Facility: HOSPITAL | Age: 57
End: 2017-09-08

## 2017-09-08 DIAGNOSIS — S93.411D SPRAIN OF CALCANEOFIBULAR LIGAMENT OF RIGHT ANKLE, SUBSEQUENT ENCOUNTER: Primary | ICD-10-CM

## 2017-09-08 DIAGNOSIS — S93.491D: ICD-10-CM

## 2017-09-08 NOTE — THERAPY DISCHARGE NOTE
Outpatient Physical Therapy Discharge Summary         Patient Name: Kasia Carroll  : 1960  MRN: [id number]    Today's Date: 2017    Visit Dx:    ICD-10-CM ICD-9-CM   1. Sprain of calcaneofibular ligament of right ankle, subsequent encounter S93.411D V58.89     845.02   2. Tear of talofibular ligament, right, subsequent encounter S93.491D V58.89     845.09             PT OP Goals       17 1300       PT Short Term Goals    STG 2 Pt to improve R ankle ROM in DF=6 deg, PF=60 deg, and ev=30 deg  -SI     STG 2 Progress Partially Met   EV 25 vs 30 degrees  -SI     Long Term Goals    LTG 1 Pt to improve R ankle ROM in DF=10 deg for improved gait pattern  -SI     LTG 1 Progress Progressing   DF 7 degrees  -SI     LTG 2 Pt to improve ankle strength to 4+ to 5  -SI     LTG 2 Progress Partially Met   met for all except EV 4/5 on right  -SI       User Key  (r) = Recorded By, (t) = Taken By, (c) = Cosigned By    Initials Name Provider Type    CRUZ Whalen PTA Physical Therapy Assistant          OP PT Discharge Summary  Date of Discharge: 17  Reason for Discharge: Independent  Outcomes Achieved: Patient able to partially acheive established goals  Discharge Destination: Home with home program  Discharge Instructions: HEP and wear ASO as needed      Time Calculation:                    Ruth Whalen PTA  2017

## 2017-09-14 ENCOUNTER — OFFICE VISIT (OUTPATIENT)
Dept: ORTHOPEDIC SURGERY | Facility: CLINIC | Age: 57
End: 2017-09-14

## 2017-09-14 VITALS — TEMPERATURE: 97.6 F | BODY MASS INDEX: 23.05 KG/M2 | WEIGHT: 135 LBS | HEIGHT: 64 IN

## 2017-09-14 DIAGNOSIS — M76.71 PERONEAL TENDONITIS OF RIGHT LOWER LEG: ICD-10-CM

## 2017-09-14 DIAGNOSIS — S93.401D SPRAIN OF RIGHT ANKLE, UNSPECIFIED LIGAMENT, SUBSEQUENT ENCOUNTER: Primary | ICD-10-CM

## 2017-09-14 PROCEDURE — 99212 OFFICE O/P EST SF 10 MIN: CPT | Performed by: ORTHOPAEDIC SURGERY

## 2017-09-14 PROCEDURE — 73610 X-RAY EXAM OF ANKLE: CPT | Performed by: ORTHOPAEDIC SURGERY

## 2017-09-14 NOTE — PROGRESS NOTES
"Ankle Follow Up      Patient: Kasia Carroll    YOB: 1960 57 y.o. female    Chief Complaints: Ankle feels good    History of Present Illness: Follows up injury to right ankle from 4/27/17 ATFL and CFL injuries and peroneal tenosynovitis but no markus tear.  She was last seen on 8/16/17 and continues to do well.  She has very occasionally some mild stinging aching pain over the inferolateral aspect of the right ankle but resolves with rest.  She is completed taking meloxicam and is been out of her brace.  She has not had any feelings of instability to the ankle and pain is improving and she is not limited in any of her activities.  HPI    ROS: ankle pain  Past Medical History:   Diagnosis Date   • History of postmenopausal HRT 5736-1492   • Hypertension    • Hypothyroidism    • Silicone leakage from breast implant 2008       Physical Exam:   Vitals:    09/14/17 1358   Temp: 97.6 °F (36.4 °C)   Weight: 135 lb (61.2 kg)   Height: 64\" (162.6 cm)     Well developed with normal mood.Nonantalgic gait.  There is mild bony prominence over the posterior lateral aspect of the right ankle but no focal pain along the peroneal tendons.  No pain or subluxation with resisted eversion.  Stable anterior drawer with no tenderness over the anterolateral ligamentous structures      Radiology: 3 views of the right ankle ordered to evaluate pain reviewed and compared with previous x-rays she has persistent ossifications over the distal and posterior lateral aspect of the right distal fibula these have not changed in alignment      Assessment/Plan:  Right ankle sprain with ATFL and CFL injuries and peroneal tenosynovitis    I discussed treatment options with her today and as she is not having any pain that limits activities and no instability and overall feels like she is improving I don't think she needs anything from a surgical standpoint.  She may advance activities slowly as tolerated I did give her prescription for " Voltaren gel at her request to apply the area she has any occasional achiness but she understands that if this persists or worsens she'll let me know.  By mutually agreement I will see her back as needed

## 2017-12-04 DIAGNOSIS — I10 ESSENTIAL HYPERTENSION: Primary | ICD-10-CM

## 2017-12-04 DIAGNOSIS — E03.9 ACQUIRED HYPOTHYROIDISM: ICD-10-CM

## 2017-12-13 LAB
ALBUMIN SERPL-MCNC: 5 G/DL (ref 3.5–5.2)
ALBUMIN/GLOB SERPL: 1.9 G/DL
ALP SERPL-CCNC: 55 U/L (ref 39–117)
ALT SERPL-CCNC: 27 U/L (ref 1–33)
AST SERPL-CCNC: 35 U/L (ref 1–32)
BILIRUB SERPL-MCNC: 0.5 MG/DL (ref 0.1–1.2)
BUN SERPL-MCNC: 3 MG/DL (ref 6–20)
BUN/CREAT SERPL: 5.8 (ref 7–25)
CALCIUM SERPL-MCNC: 9.4 MG/DL (ref 8.6–10.5)
CHLORIDE SERPL-SCNC: 98 MMOL/L (ref 98–107)
CHOLEST SERPL-MCNC: 277 MG/DL (ref 0–200)
CO2 SERPL-SCNC: 28.5 MMOL/L (ref 22–29)
CREAT SERPL-MCNC: 0.52 MG/DL (ref 0.57–1)
GFR SERPLBLD CREATININE-BSD FMLA CKD-EPI: 121 ML/MIN/1.73
GFR SERPLBLD CREATININE-BSD FMLA CKD-EPI: 147 ML/MIN/1.73
GLOBULIN SER CALC-MCNC: 2.7 GM/DL
GLUCOSE SERPL-MCNC: 82 MG/DL (ref 65–99)
HDLC SERPL-MCNC: 94 MG/DL (ref 40–60)
LDLC SERPL CALC-MCNC: 161 MG/DL (ref 0–100)
LDLC/HDLC SERPL: 1.72 {RATIO}
POTASSIUM SERPL-SCNC: 4.1 MMOL/L (ref 3.5–5.2)
PROT SERPL-MCNC: 7.7 G/DL (ref 6–8.5)
SODIUM SERPL-SCNC: 139 MMOL/L (ref 136–145)
T4 FREE SERPL-MCNC: 1.8 NG/DL (ref 0.93–1.7)
TRIGL SERPL-MCNC: 108 MG/DL (ref 0–150)
TSH SERPL DL<=0.005 MIU/L-ACNC: 1.6 MIU/ML (ref 0.27–4.2)
VLDLC SERPL CALC-MCNC: 21.6 MG/DL (ref 5–40)

## 2017-12-27 ENCOUNTER — OFFICE VISIT (OUTPATIENT)
Dept: FAMILY MEDICINE CLINIC | Facility: CLINIC | Age: 57
End: 2017-12-27

## 2017-12-27 VITALS
HEART RATE: 72 BPM | SYSTOLIC BLOOD PRESSURE: 120 MMHG | BODY MASS INDEX: 23.05 KG/M2 | HEIGHT: 64 IN | WEIGHT: 135 LBS | OXYGEN SATURATION: 95 % | DIASTOLIC BLOOD PRESSURE: 80 MMHG | TEMPERATURE: 98.6 F

## 2017-12-27 DIAGNOSIS — M25.562 ACUTE PAIN OF BOTH KNEES: Primary | ICD-10-CM

## 2017-12-27 DIAGNOSIS — E03.9 HYPOTHYROIDISM (ACQUIRED): ICD-10-CM

## 2017-12-27 DIAGNOSIS — E78.2 MIXED HYPERLIPIDEMIA: ICD-10-CM

## 2017-12-27 DIAGNOSIS — I10 ESSENTIAL HYPERTENSION: ICD-10-CM

## 2017-12-27 DIAGNOSIS — M25.561 ACUTE PAIN OF BOTH KNEES: Primary | ICD-10-CM

## 2017-12-27 PROBLEM — M76.71 PERONEAL TENDONITIS OF RIGHT LOWER LEG: Status: RESOLVED | Noted: 2017-08-16 | Resolved: 2017-12-27

## 2017-12-27 PROBLEM — S93.401A SPRAIN OF RIGHT ANKLE: Status: RESOLVED | Noted: 2017-05-18 | Resolved: 2017-12-27

## 2017-12-27 PROBLEM — S82.61XA CLOSED DISPLACED FRACTURE OF LATERAL MALLEOLUS OF RIGHT FIBULA: Status: RESOLVED | Noted: 2017-05-18 | Resolved: 2017-12-27

## 2017-12-27 PROBLEM — M25.571 ACUTE RIGHT ANKLE PAIN: Status: RESOLVED | Noted: 2017-05-15 | Resolved: 2017-12-27

## 2017-12-27 PROCEDURE — 73560 X-RAY EXAM OF KNEE 1 OR 2: CPT | Performed by: FAMILY MEDICINE

## 2017-12-27 PROCEDURE — 99213 OFFICE O/P EST LOW 20 MIN: CPT | Performed by: FAMILY MEDICINE

## 2017-12-27 NOTE — PROGRESS NOTES
Procedure   Procedures   X Ray report:    To evaluate her complaint of popping and stinging in her knee I have requested a knee x-ray.  There are no old ones to compare this to but this shows normal preservation of joint space without any obvious effusion or significant degenerative joint disease.

## 2017-12-27 NOTE — PROGRESS NOTES
Subjective   Kasia Carroll is a 57 y.o. female presenting with   Chief Complaint   Patient presents with   • Knee Pain     right knee pops when she goes down some stairs    left knee stinging feeling         HPI Comments: This is a 57-year-old white female nonsmoker who comes in today with the complaint that she has stinging and burning in her left knee and her right knee pops when she goes up and down stairs.  She says that this has started since she has gotten a new  at her gym who has her doing lunges and squats while weightbearing.  She carries a 15 pound dumbbell in both hands while she is doing this.  She has not actually noticed any obvious swelling of her joints.  She does not feel her joints are unstable.  She just says it stings a little when she flexes it and pops a lot.    She already is on the scheduled to see her orthopedist, but thought she would come in her first because she can get in here quicker.    Knee Pain           The following portions of the patient's history were reviewed and updated as appropriate: current medications, past family history, past medical history, past social history, past surgical history and problem list.    Review of Systems   Musculoskeletal: Positive for arthralgias.   All other systems reviewed and are negative.      Objective   Physical Exam   Constitutional: She is oriented to person, place, and time. She appears well-developed and well-nourished. No distress.   HENT:   Head: Normocephalic and atraumatic.   Eyes: EOM are normal. Pupils are equal, round, and reactive to light.   Neck: Normal range of motion. Neck supple. No thyromegaly present.   Pulmonary/Chest: Effort normal.   Musculoskeletal: Normal range of motion. She exhibits tenderness (odest crepitance on range of motion of the left knee but both kneeshave negative Lachman and Fina and no effusion and full range of motion). She exhibits no edema or deformity.   Lymphadenopathy:     She has no  cervical adenopathy.   Neurological: She is alert and oriented to person, place, and time.   Skin: Skin is warm and dry. She is not diaphoretic.   Psychiatric: She has a normal mood and affect. Her behavior is normal.   Nursing note and vitals reviewed.      Assessment/Plan   Kasia was seen today for knee pain.    Diagnoses and all orders for this visit:    Acute pain of both knees  -     XR knee 1 or 2 vw left    Essential hypertension    Mixed hyperlipidemia    Hypothyroidism (acquired)                   I would like him to return for another visit in 6 month(s)

## 2017-12-27 NOTE — PATIENT INSTRUCTIONS
This is a very nice 57-year-old who has some crepitance and popping sensation in her knees.  I would like her to avoid squats and lunges while weightbearing and use ibuprofen or Aleve and see if this resolves.  If it does not, she should keep her appointment with her orthopedist.

## 2017-12-29 RX ORDER — LEVOTHYROXINE SODIUM 0.07 MG/1
TABLET ORAL
Qty: 90 TABLET | Refills: 1 | Status: SHIPPED | OUTPATIENT
Start: 2017-12-29 | End: 2018-06-25 | Stop reason: SDUPTHER

## 2017-12-29 RX ORDER — AMLODIPINE BESYLATE 5 MG/1
TABLET ORAL
Qty: 90 TABLET | Refills: 1 | Status: SHIPPED | OUTPATIENT
Start: 2017-12-29 | End: 2018-06-25 | Stop reason: SDUPTHER

## 2018-01-08 ENCOUNTER — OFFICE VISIT (OUTPATIENT)
Dept: FAMILY MEDICINE CLINIC | Facility: CLINIC | Age: 58
End: 2018-01-08

## 2018-01-08 VITALS
TEMPERATURE: 98.1 F | WEIGHT: 137 LBS | BODY MASS INDEX: 23.39 KG/M2 | OXYGEN SATURATION: 99 % | SYSTOLIC BLOOD PRESSURE: 136 MMHG | HEART RATE: 86 BPM | DIASTOLIC BLOOD PRESSURE: 80 MMHG | HEIGHT: 64 IN

## 2018-01-08 DIAGNOSIS — N30.01 ACUTE CYSTITIS WITH HEMATURIA: Primary | ICD-10-CM

## 2018-01-08 LAB
BILIRUB BLD-MCNC: NEGATIVE MG/DL
CLARITY, POC: ABNORMAL
COLOR UR: YELLOW
GLUCOSE UR STRIP-MCNC: NEGATIVE MG/DL
KETONES UR QL: ABNORMAL
LEUKOCYTE EST, POC: ABNORMAL
NITRITE UR-MCNC: POSITIVE MG/ML
PH UR: 6.5 [PH] (ref 5–8)
PROT UR STRIP-MCNC: ABNORMAL MG/DL
RBC # UR STRIP: ABNORMAL /UL
SP GR UR: 1.01 (ref 1–1.03)
UROBILINOGEN UR QL: ABNORMAL

## 2018-01-08 PROCEDURE — 81003 URINALYSIS AUTO W/O SCOPE: CPT | Performed by: FAMILY MEDICINE

## 2018-01-08 PROCEDURE — 99213 OFFICE O/P EST LOW 20 MIN: CPT | Performed by: FAMILY MEDICINE

## 2018-01-08 RX ORDER — SULFAMETHOXAZOLE AND TRIMETHOPRIM 800; 160 MG/1; MG/1
1 TABLET ORAL 2 TIMES DAILY
Qty: 10 TABLET | Refills: 0 | Status: SHIPPED | OUTPATIENT
Start: 2018-01-08 | End: 2018-09-17

## 2018-01-08 RX ORDER — FLUCONAZOLE 150 MG/1
150 TABLET ORAL ONCE
Qty: 2 TABLET | Refills: 0 | Status: SHIPPED | OUTPATIENT
Start: 2018-01-08 | End: 2018-01-08

## 2018-01-08 NOTE — PROGRESS NOTES
Subjective   Kasia Carroll is a 57 y.o. female presenting with   Chief Complaint   Patient presents with   • blood in urine     burning with urination         HPI Comments: 57-year-old white female says she was in [place] 2 days ago and had gross hematuria.  Since then she has had burning on urination but has not seen a lot of blood in her urine.    She does not have any high fever or chills or flank pain or dizziness or nausea or vomiting.       The following portions of the patient's history were reviewed and updated as appropriate: current medications, past family history, past medical history, past social history, past surgical history and problem list.    Review of Systems   Genitourinary: Positive for dysuria.   All other systems reviewed and are negative.      Objective   Physical Exam   Constitutional: She is oriented to person, place, and time. She appears well-developed and well-nourished. No distress.   HENT:   Head: Normocephalic and atraumatic.   Eyes: EOM are normal. Pupils are equal, round, and reactive to light.   Neck: Normal range of motion. Neck supple.   Cardiovascular: Normal rate and regular rhythm.    Pulmonary/Chest: Effort normal and breath sounds normal.   Abdominal: Soft. Bowel sounds are normal. She exhibits no distension and no mass. There is no tenderness.   Musculoskeletal: Normal range of motion.   Neurological: She is alert and oriented to person, place, and time.   Skin: Skin is warm and dry. She is not diaphoretic.   Psychiatric: She has a normal mood and affect. Her behavior is normal.   Nursing note and vitals reviewed.      Assessment/Plan   Kasia was seen today for blood in urine.    Diagnoses and all orders for this visit:    Acute cystitis with hematuria  -     Urine Culture - Urine, Urine, Clean Catch    Other orders  -     sulfamethoxazole-trimethoprim (BACTRIM DS,SEPTRA DS) 800-160 MG per tablet; Take 1 tablet by mouth 2 (Two) Times a Day.  -     fluconazole (DIFLUCAN)  150 MG tablet; Take 1 tablet by mouth 1 (One) Time for 1 dose.                   I would like him to return for another visit in 6 month(s)

## 2018-01-13 LAB
BACTERIA UR CULT: ABNORMAL
BACTERIA UR CULT: ABNORMAL
OTHER ANTIBIOTIC SUSC ISLT: ABNORMAL

## 2018-01-19 ENCOUNTER — CLINICAL SUPPORT (OUTPATIENT)
Dept: FAMILY MEDICINE CLINIC | Facility: CLINIC | Age: 58
End: 2018-01-19

## 2018-01-19 DIAGNOSIS — N30.01 ACUTE CYSTITIS WITH HEMATURIA: Primary | ICD-10-CM

## 2018-01-19 LAB
BILIRUB BLD-MCNC: NEGATIVE MG/DL
CLARITY, POC: ABNORMAL
COLOR UR: YELLOW
GLUCOSE UR STRIP-MCNC: NEGATIVE MG/DL
KETONES UR QL: NEGATIVE
LEUKOCYTE EST, POC: ABNORMAL
NITRITE UR-MCNC: NEGATIVE MG/ML
PH UR: 7.5 [PH] (ref 5–8)
PROT UR STRIP-MCNC: NEGATIVE MG/DL
RBC # UR STRIP: NEGATIVE /UL
SP GR UR: 1 (ref 1–1.03)
UROBILINOGEN UR QL: NORMAL

## 2018-01-19 PROCEDURE — 81003 URINALYSIS AUTO W/O SCOPE: CPT | Performed by: FAMILY MEDICINE

## 2018-01-21 LAB
BACTERIA UR CULT: NORMAL
BACTERIA UR CULT: NORMAL

## 2018-05-02 ENCOUNTER — OFFICE VISIT (OUTPATIENT)
Dept: FAMILY MEDICINE CLINIC | Facility: CLINIC | Age: 58
End: 2018-05-02

## 2018-05-02 VITALS
HEART RATE: 72 BPM | OXYGEN SATURATION: 99 % | SYSTOLIC BLOOD PRESSURE: 146 MMHG | HEIGHT: 64 IN | WEIGHT: 134.8 LBS | TEMPERATURE: 97.6 F | BODY MASS INDEX: 23.01 KG/M2 | DIASTOLIC BLOOD PRESSURE: 79 MMHG

## 2018-05-02 DIAGNOSIS — R20.2 NUMBNESS AND TINGLING OF BOTH FEET: ICD-10-CM

## 2018-05-02 DIAGNOSIS — E78.2 MIXED HYPERLIPIDEMIA: ICD-10-CM

## 2018-05-02 DIAGNOSIS — R20.0 NUMBNESS AND TINGLING OF BOTH FEET: ICD-10-CM

## 2018-05-02 DIAGNOSIS — I10 ESSENTIAL HYPERTENSION: Primary | ICD-10-CM

## 2018-05-02 DIAGNOSIS — E03.9 HYPOTHYROIDISM (ACQUIRED): ICD-10-CM

## 2018-05-02 PROCEDURE — 99213 OFFICE O/P EST LOW 20 MIN: CPT | Performed by: FAMILY MEDICINE

## 2018-05-02 NOTE — PATIENT INSTRUCTIONS
This is a very nice 57-year-old who is here for evaluation of transient numbness in both feet.  I will request blood work and notify her when the results are available.  If the problem persists we might want to do a nerve conduction study.

## 2018-05-02 NOTE — PROGRESS NOTES
Subjective   Kasia Carroll is a 57 y.o. female presenting with   Chief Complaint   Patient presents with   • Numbness     numbness in toes and her hair thining        57-year-old  white female nonsmoker tells me that she thinks her systolic blood pressure is up right now because she just walked up 5 flights of stairs since the elevator is broken.    She is actually here for evaluation of thinning hair and transient tingling in both her feet.  Right now her feet feel normal and she read on the Internet that the thinning hair and transient tingling could both be related to her thyroid.  She already is on Synthroid but only 75 µg.  I told her I would need to check and see if that was not an adequate dose.    She denies any back pain or history of herniated disc.  She denies any difficulty controlling her bowel or bladder.  She denies any weakness in her legs.         The following portions of the patient's history were reviewed and updated as appropriate: current medications, past family history, past medical history, past social history, past surgical history and problem list.    Review of Systems   Neurological: Positive for numbness.   All other systems reviewed and are negative.      Objective   Physical Exam   Constitutional: She is oriented to person, place, and time. She appears well-developed and well-nourished. No distress.   HENT:   Head: Normocephalic and atraumatic.   Mouth/Throat: Oropharynx is clear and moist. No oropharyngeal exudate.   Eyes: EOM are normal. Pupils are equal, round, and reactive to light.   Neck: Normal range of motion. Neck supple. No thyromegaly present.   Cardiovascular: Normal rate, regular rhythm, normal heart sounds and intact distal pulses.  Exam reveals no friction rub.    No murmur heard.  Pulmonary/Chest: Effort normal and breath sounds normal. No respiratory distress. She has no wheezes. She has no rales.   Musculoskeletal: Normal range of motion. She exhibits no  edema, tenderness or deformity.   Lymphadenopathy:     She has no cervical adenopathy.   Neurological: She is alert and oriented to person, place, and time. She has normal strength and normal reflexes. She displays normal reflexes. No cranial nerve deficit or sensory deficit. Coordination normal.   Skin: Skin is warm and dry. No rash noted. She is not diaphoretic.   Psychiatric: She has a normal mood and affect. Her behavior is normal.   Nursing note and vitals reviewed.      Assessment/Plan   Kasia was seen today for numbness.    Diagnoses and all orders for this visit:    Essential hypertension  -     Comprehensive Metabolic Panel  -     TSH    Mixed hyperlipidemia  -     Comprehensive Metabolic Panel    Hypothyroidism (acquired)  -     Comprehensive Metabolic Panel  -     TSH  -     T4, Free    Numbness and tingling of both feet  -     Comprehensive Metabolic Panel  -     TSH  -     CBC & Differential  -     Hemoglobin A1c  -     T4, Free                   I would like him to return for another visit in 6 month(s)

## 2018-05-03 LAB
ALBUMIN SERPL-MCNC: 4.9 G/DL (ref 3.5–5.2)
ALBUMIN/GLOB SERPL: 2.2 G/DL
ALP SERPL-CCNC: 48 U/L (ref 39–117)
ALT SERPL-CCNC: 34 U/L (ref 1–33)
AST SERPL-CCNC: 55 U/L (ref 1–32)
BASOPHILS # BLD AUTO: 0.01 10*3/MM3 (ref 0–0.2)
BASOPHILS NFR BLD AUTO: 0.3 % (ref 0–1.5)
BILIRUB SERPL-MCNC: 0.6 MG/DL (ref 0.1–1.2)
BUN SERPL-MCNC: 7 MG/DL (ref 6–20)
BUN/CREAT SERPL: 13.7 (ref 7–25)
CALCIUM SERPL-MCNC: 9.7 MG/DL (ref 8.6–10.5)
CHLORIDE SERPL-SCNC: 93 MMOL/L (ref 98–107)
CO2 SERPL-SCNC: 26.9 MMOL/L (ref 22–29)
CREAT SERPL-MCNC: 0.51 MG/DL (ref 0.57–1)
EOSINOPHIL # BLD AUTO: 0.12 10*3/MM3 (ref 0–0.7)
EOSINOPHIL NFR BLD AUTO: 3.2 % (ref 0.3–6.2)
ERYTHROCYTE [DISTWIDTH] IN BLOOD BY AUTOMATED COUNT: 12.7 % (ref 11.7–13)
GFR SERPLBLD CREATININE-BSD FMLA CKD-EPI: 124 ML/MIN/1.73
GFR SERPLBLD CREATININE-BSD FMLA CKD-EPI: 150 ML/MIN/1.73
GLOBULIN SER CALC-MCNC: 2.2 GM/DL
GLUCOSE SERPL-MCNC: 96 MG/DL (ref 65–99)
HBA1C MFR BLD: 4.9 % (ref 4.8–5.6)
HCT VFR BLD AUTO: 42.3 % (ref 35.6–45.5)
HGB BLD-MCNC: 14.1 G/DL (ref 11.9–15.5)
IMM GRANULOCYTES # BLD: 0 10*3/MM3 (ref 0–0.03)
IMM GRANULOCYTES NFR BLD: 0 % (ref 0–0.5)
LYMPHOCYTES # BLD AUTO: 1.03 10*3/MM3 (ref 0.9–4.8)
LYMPHOCYTES NFR BLD AUTO: 27.3 % (ref 19.6–45.3)
MCH RBC QN AUTO: 33.7 PG (ref 26.9–32)
MCHC RBC AUTO-ENTMCNC: 33.3 G/DL (ref 32.4–36.3)
MCV RBC AUTO: 101 FL (ref 80.5–98.2)
MONOCYTES # BLD AUTO: 0.5 10*3/MM3 (ref 0.2–1.2)
MONOCYTES NFR BLD AUTO: 13.3 % (ref 5–12)
NEUTROPHILS # BLD AUTO: 2.11 10*3/MM3 (ref 1.9–8.1)
NEUTROPHILS NFR BLD AUTO: 55.9 % (ref 42.7–76)
PLATELET # BLD AUTO: 242 10*3/MM3 (ref 140–500)
POTASSIUM SERPL-SCNC: 4.4 MMOL/L (ref 3.5–5.2)
PROT SERPL-MCNC: 7.1 G/DL (ref 6–8.5)
RBC # BLD AUTO: 4.19 10*6/MM3 (ref 3.9–5.2)
SODIUM SERPL-SCNC: 135 MMOL/L (ref 136–145)
T4 FREE SERPL-MCNC: 1.66 NG/DL (ref 0.93–1.7)
TSH SERPL DL<=0.005 MIU/L-ACNC: 2.21 MIU/ML (ref 0.27–4.2)
WBC # BLD AUTO: 3.77 10*3/MM3 (ref 4.5–10.7)

## 2018-06-25 RX ORDER — AMLODIPINE BESYLATE 5 MG/1
TABLET ORAL
Qty: 90 TABLET | Refills: 3 | Status: SHIPPED | OUTPATIENT
Start: 2018-06-25 | End: 2019-07-09 | Stop reason: SDUPTHER

## 2018-06-25 RX ORDER — LEVOTHYROXINE SODIUM 0.07 MG/1
TABLET ORAL
Qty: 90 TABLET | Refills: 3 | Status: SHIPPED | OUTPATIENT
Start: 2018-06-25 | End: 2019-07-09 | Stop reason: SDUPTHER

## 2018-07-26 ENCOUNTER — OFFICE VISIT (OUTPATIENT)
Dept: OBSTETRICS AND GYNECOLOGY | Facility: CLINIC | Age: 58
End: 2018-07-26

## 2018-07-26 ENCOUNTER — PROCEDURE VISIT (OUTPATIENT)
Dept: OBSTETRICS AND GYNECOLOGY | Facility: CLINIC | Age: 58
End: 2018-07-26

## 2018-07-26 VITALS
WEIGHT: 133.8 LBS | BODY MASS INDEX: 22.29 KG/M2 | DIASTOLIC BLOOD PRESSURE: 84 MMHG | SYSTOLIC BLOOD PRESSURE: 118 MMHG | HEIGHT: 65 IN

## 2018-07-26 DIAGNOSIS — R14.0 ABDOMINAL BLOATING: Primary | ICD-10-CM

## 2018-07-26 DIAGNOSIS — N94.9 VAGINAL DISCOMFORT: Primary | ICD-10-CM

## 2018-07-26 PROCEDURE — 99213 OFFICE O/P EST LOW 20 MIN: CPT | Performed by: NURSE PRACTITIONER

## 2018-07-26 PROCEDURE — 76830 TRANSVAGINAL US NON-OB: CPT | Performed by: NURSE PRACTITIONER

## 2018-07-26 NOTE — PROGRESS NOTES
[facility]  Routine Annual Visit    2018    Patient: Kasia Carroll          MR#:[id number]      History of Present Illness    58 y.o. female  who presents with questions regarding her estrace cream. Kasia was encouraged to use estrace by both her urologist and Marimar- she has been using three times a week for about a year. She denies pain with IC but states she notices when she forgets to use for several weeks at a time- feels burning/odd sensation. She also reports some mild vaginal pressure. Postmenopausal- no bleeding. No HRT other than estrace.    No LMP recorded. Patient is postmenopausal.  Obstetric History:  OB History      Para Term  AB Living    2 2 2     2    SAB TAB Ectopic Molar Multiple Live Births              2         Menstrual History:     No LMP recorded. Patient is postmenopausal.       Sexual History:       ________________________________________  Patient Active Problem List   Diagnosis   • Abnormal CAT scan   • Breast calcification seen on mammogram   • Esophagitis, reflux   • Essential hypertension   • Hypothyroidism (acquired)   • Mixed hyperlipidemia   • Peroneal tendon injury   • Acute pain of both knees   • Numbness and tingling of both feet       Past Medical History:   Diagnosis Date   • Disease of thyroid gland    • History of postmenopausal HRT [id number]   • Hypertension    • Silicone leakage from breast implant    • Urinary tract infection        Past Surgical History:   Procedure Laterality Date   • BREAST AUGMENTATION  1989, redone due to leakage.   • BREAST CAPSULOTOMY, IMPLANT REVISION     • BREAST EXCISIONAL BIOPSY Right     Dr. Bentley Patino, benign   • COSMETIC SURGERY      eyelids lifted/forehead   • TOE AMPUTATION Left 1964       History   Smoking Status   • Former Smoker   • Packs/day: 0.50   • Types: Cigarettes   • Quit date: 2013   Smokeless Tobacco   • Never Used       Family History   Problem  "Relation Age of Onset   • Hypertension Mother    • Alzheimer's disease Mother    • Prostate cancer Father        Prior to Admission medications    Medication Sig Start Date End Date Taking? Authorizing Provider   amLODIPine (NORVASC) 5 MG tablet TAKE ONE TABLET BY MOUTH DAILY 6/25/18   Erasmo Sanchez MD   diclofenac (VOLTAREN) 1 % gel gel Apply 4 g topically 2 (Two) Times a Day. Use per pkg insert 9/14/17   Flavio Apodaca MD   estradiol (ESTRACE) 0.1 MG/GM vaginal cream Insert 1 g into the vagina Every Night. Use 1 gm twice weekly 8/9/17   GENIE Carrillo   levothyroxine (SYNTHROID, LEVOTHROID) 75 MCG tablet TAKE ONE TABLET BY MOUTH DAILY 6/25/18   Erasmo Sanchez MD   Probiotic Product (PROBIOTIC DAILY PO) Take  by mouth.    Historical Provider, MD   sulfamethoxazole-trimethoprim (BACTRIM DS,SEPTRA DS) 800-160 MG per tablet Take 1 tablet by mouth 2 (Two) Times a Day. 1/8/18   Erasmo Sanchez MD       The following portions of the patient's history were reviewed and updated as appropriate: allergies, current medications, past family history, past medical history, past social history, past surgical history and problem list.    Review of Systems   Constitutional: Negative for chills, fatigue and fever.   Gastrointestinal: Negative for abdominal distention and abdominal pain.   Genitourinary: Negative for difficulty urinating, dyspareunia, dysuria, frequency, genital sores, hematuria, menstrual problem, pelvic pain, urgency, vaginal bleeding, vaginal discharge and vaginal pain.       Objective   Physical Exam    /84   Ht 163.8 cm (64.5\")   Wt 60.7 kg (133 lb 12.8 oz)   BMI 22.61 kg/m²    BP Readings from Last 3 Encounters:   07/26/18 118/84   05/02/18 146/79   01/08/18 136/80      Wt Readings from Last 3 Encounters:   07/26/18 60.7 kg (133 lb 12.8 oz)   05/02/18 61.1 kg (134 lb 12.8 oz)   01/08/18 62.1 kg (137 lb)        BMI: Estimated body mass index is 22.61 kg/m² as calculated from the " "following:    Height as of this encounter: 163.8 cm (64.5\").    Weight as of this encounter: 60.7 kg (133 lb 12.8 oz).       Vulva: normal   Vagina: normal mucosa, normal discharge   Cervix: no cervical motion tenderness and no lesions   Uterus: normal size, mobile, non-tender or normal shape and consistency   Adnexa: no mass, fullness, tenderness     Assessment:    1. Exam and US unremarkable today. Counseled regarding estrace therapy and the importance of consistency to avoid breakthrough symptoms. Recommend using twice a week but very consistently. Kasia verbalizes understanding and agrees with plan.    Plan:    []  Rx:   []  Mammogram request made  []  PAP done  []  Occult fecal blood test ([facility])  []  Labs:   []  GC/Chl/TV  []  DEXA scan   []  Referral for colonoscopy:           GENIE Wagoner  7/26/2018 11:27 AM  "

## 2018-09-11 ENCOUNTER — TELEPHONE (OUTPATIENT)
Dept: OBSTETRICS AND GYNECOLOGY | Facility: CLINIC | Age: 58
End: 2018-09-11

## 2018-09-11 RX ORDER — ESTRADIOL 0.1 MG/G
0.5 CREAM VAGINAL 2 TIMES WEEKLY
Qty: 42.5 G | Refills: 2 | Status: SHIPPED | OUTPATIENT
Start: 2018-09-13 | End: 2019-10-25 | Stop reason: SDUPTHER

## 2018-09-11 NOTE — TELEPHONE ENCOUNTER
"PHARMACIST CALLED NEEDING CLARIFICATION FOR SCRIPT FOR estradiol (ESTRACE) 0.1 MG/GM vaginal cream     YOU STATED IN THE SIG SECTION THE FOLLOWING:    \"SIG: INSERT 0.5 G INTO THE VAGINA 2(TWO) TIMES A WEEK, USE 1 GM TWICE WEEKLY. \"    HE NEEDS CLARIFICATION ON THIS STATEMENT.    PHARMACY:  [phone]  "

## 2018-09-12 DIAGNOSIS — Z78.0 POST-MENOPAUSAL: Primary | ICD-10-CM

## 2018-09-12 DIAGNOSIS — Z12.31 VISIT FOR SCREENING MAMMOGRAM: Primary | ICD-10-CM

## 2018-09-17 ENCOUNTER — OFFICE VISIT (OUTPATIENT)
Dept: ORTHOPEDIC SURGERY | Facility: CLINIC | Age: 58
End: 2018-09-17

## 2018-09-17 VITALS — TEMPERATURE: 100 F | WEIGHT: 135.2 LBS | BODY MASS INDEX: 22.53 KG/M2 | HEIGHT: 65 IN

## 2018-09-17 DIAGNOSIS — R29.898 FLOATING OSSICLE OF ANKLE: ICD-10-CM

## 2018-09-17 DIAGNOSIS — M76.71 PERONEAL TENDONITIS OF RIGHT LOWER LEG: Primary | ICD-10-CM

## 2018-09-17 DIAGNOSIS — S93.401D SPRAIN OF RIGHT ANKLE, UNSPECIFIED LIGAMENT, SUBSEQUENT ENCOUNTER: ICD-10-CM

## 2018-09-17 PROBLEM — S93.401A SPRAIN OF RIGHT ANKLE: Status: ACTIVE | Noted: 2018-09-17

## 2018-09-17 PROCEDURE — 99213 OFFICE O/P EST LOW 20 MIN: CPT | Performed by: ORTHOPAEDIC SURGERY

## 2018-09-17 PROCEDURE — 73610 X-RAY EXAM OF ANKLE: CPT | Performed by: ORTHOPAEDIC SURGERY

## 2018-09-18 NOTE — PROGRESS NOTES
"Ankle Follow Up      Patient: Kasia Carroll    YOB: 1960 58 y.o. female    Chief Complaints: Ankle  has a bump    History of Present Illness: Patient was last seen on 9/14/17 after injury to the right ankle with ATFL and CFL  avulsion injuries and peroneal tenosynovitis without tear.  At that time she was doing relatively well not really having pain that limited activity instructions were given to advance activities as tolerated and use Voltaren gel as needed.    She has noticed recently some prominence over the posterior lateral aspect of the right ankle someone told her she should \"have it looked at\".  This really having pain or feelings of instability of than occasional slight spurring that resolves quickly.  HPI    ROS: ankle pain  Past Medical History:   Diagnosis Date   • Disease of thyroid gland    • History of postmenopausal HRT [id number]   • Hypertension    • Silicone leakage from breast implant 2008   • Urinary tract infection        Physical Exam:   Vitals:    09/17/18 1522   Temp: 100 °F (37.8 °C)   TempSrc: Temporal Artery    Weight: 61.3 kg (135 lb 3.2 oz)   Height: 163.8 cm (64.5\")     Well developed with normal mood.  Nonantalgic gait.  There is mild to moderate bony prominence of the posterior lateral aspect of the right ankle but this is completely nontender to palpation.  She is nontender over the anterolateral ligamentous structures were stable anterior drawer and no focal tenderness along the peroneal tendons with 5 out of 5 eversion strength.      Radiology: 3 views the right ankle ordered to evaluate pain reviewed and compared with previous x-rays.  There is been consolidation of ossification over the posterior inferior aspect of the distal fibula.  With a small separate ossification distally.      Assessment/Plan:  1.  Previous right ankle sprain with ATFL and CFL injuries and peroneal tenosynovitis now with ossification of the posterior lateral aspect of the right " fibula.    We discussed treatment options and as she is not really having any pain or instability would not recommend surgical treatment for this.  This is the area where the SPR inserts and will need to be reconstructed is likely should this be resected this.    By mutual agreement she will continue with activities as tolerated and if she develops any pain locking catching or giving way she'll let me know otherwise I will see her back as needed.

## 2018-10-01 ENCOUNTER — APPOINTMENT (OUTPATIENT)
Dept: WOMENS IMAGING | Facility: HOSPITAL | Age: 58
End: 2018-10-01

## 2018-10-01 PROCEDURE — 77063 BREAST TOMOSYNTHESIS BI: CPT | Performed by: RADIOLOGY

## 2018-10-01 PROCEDURE — 77080 DXA BONE DENSITY AXIAL: CPT | Performed by: RADIOLOGY

## 2018-10-01 PROCEDURE — MDREVIEWSP: Performed by: RADIOLOGY

## 2018-10-01 PROCEDURE — 77067 SCR MAMMO BI INCL CAD: CPT | Performed by: RADIOLOGY

## 2018-10-08 ENCOUNTER — TELEPHONE (OUTPATIENT)
Dept: OBSTETRICS AND GYNECOLOGY | Facility: CLINIC | Age: 58
End: 2018-10-08

## 2018-10-08 PROBLEM — M85.80 OSTEOPENIA: Status: ACTIVE | Noted: 2018-10-08

## 2018-10-08 NOTE — TELEPHONE ENCOUNTER
LVM informing pt per Nina that Dexa came back with osteopenia with slight worsening of bone loss since last Dexa. Said that she could discuss further with Nina at pt annual or with PCP Dr. Sanchez. Gave recommendation to start calcium and vit D supplements and how much to start taking. Told her to call if she had any questions before appt.   ----- Message from GENIE Gatica sent at 10/8/2018 11:30 AM EDT -----  Please recommend 1,200 mg calcium and 1,000 IU vitamin D daily

## 2018-10-10 ENCOUNTER — OFFICE VISIT (OUTPATIENT)
Dept: FAMILY MEDICINE CLINIC | Facility: CLINIC | Age: 58
End: 2018-10-10

## 2018-10-10 VITALS
TEMPERATURE: 98 F | WEIGHT: 135.1 LBS | BODY MASS INDEX: 23.06 KG/M2 | SYSTOLIC BLOOD PRESSURE: 110 MMHG | HEART RATE: 70 BPM | OXYGEN SATURATION: 99 % | DIASTOLIC BLOOD PRESSURE: 60 MMHG | HEIGHT: 64 IN

## 2018-10-10 DIAGNOSIS — G62.9 NEUROPATHY: Primary | ICD-10-CM

## 2018-10-10 DIAGNOSIS — L20.9 ATOPIC DERMATITIS, UNSPECIFIED TYPE: ICD-10-CM

## 2018-10-10 PROCEDURE — 99213 OFFICE O/P EST LOW 20 MIN: CPT | Performed by: NURSE PRACTITIONER

## 2018-10-10 PROCEDURE — 90674 CCIIV4 VAC NO PRSV 0.5 ML IM: CPT | Performed by: NURSE PRACTITIONER

## 2018-10-10 PROCEDURE — 90471 IMMUNIZATION ADMIN: CPT | Performed by: NURSE PRACTITIONER

## 2018-10-10 RX ORDER — METHYLPREDNISOLONE 4 MG/1
TABLET ORAL
Qty: 21 TABLET | Refills: 0 | Status: SHIPPED | OUTPATIENT
Start: 2018-10-10 | End: 2019-07-09

## 2018-10-12 NOTE — PROGRESS NOTES
Subjective   Kasia Carroll is a 58 y.o. female presents with numbness and tingling, intermittent, over a month, on the left side mostly. It is located in her hands and her feet, comes and goes. No known triggers.     Also with dry skin around mouth, not tried any medications previously.    Upper Extremity Issue   This is a recurrent problem. The current episode started more than 1 month ago. The problem occurs intermittently. The problem has been waxing and waning. Associated symptoms include arthralgias and numbness (and tingling, intermittent,). Pertinent negatives include no abdominal pain, anorexia, change in bowel habit, chest pain, chills, congestion, coughing, diaphoresis, fatigue, fever, headaches, joint swelling, myalgias, nausea, neck pain, rash, sore throat, swollen glands, urinary symptoms, vertigo, visual change, vomiting or weakness. Nothing aggravates the symptoms. She has tried nothing for the symptoms. The treatment provided no relief.   Lower Extremity Issue   This is a recurrent problem. The current episode started more than 1 month ago. The problem occurs intermittently. The problem has been unchanged. Associated symptoms include arthralgias and numbness (and tingling, intermittent,). Pertinent negatives include no abdominal pain, anorexia, change in bowel habit, chest pain, chills, congestion, coughing, diaphoresis, fatigue, fever, headaches, joint swelling, myalgias, nausea, neck pain, rash, sore throat, swollen glands, urinary symptoms, vertigo, visual change, vomiting or weakness. Nothing aggravates the symptoms. She has tried nothing for the symptoms. The treatment provided no relief.        The following portions of the patient's history were reviewed and updated as appropriate: allergies, current medications, past family history, past medical history, past social history, past surgical history and problem list.    Review of Systems   Constitutional: Negative.  Negative for chills,  diaphoresis, fatigue and fever.   HENT: Negative.  Negative for congestion and sore throat.    Eyes: Negative.    Respiratory: Negative.  Negative for cough.    Cardiovascular: Negative.  Negative for chest pain.   Gastrointestinal: Negative.  Negative for abdominal pain, anorexia, change in bowel habit, nausea and vomiting.   Endocrine: Negative.    Genitourinary: Negative.    Musculoskeletal: Positive for arthralgias. Negative for joint swelling, myalgias and neck pain.   Skin: Negative.  Negative for rash.   Allergic/Immunologic: Negative.    Neurological: Positive for numbness (and tingling, intermittent,). Negative for vertigo, weakness and headaches.   Hematological: Negative.    Psychiatric/Behavioral: Negative.        Objective   Physical Exam   Constitutional: She is oriented to person, place, and time.   Cardiovascular: Normal rate, regular rhythm and normal heart sounds.  Exam reveals no gallop and no friction rub.    No murmur heard.  Pulmonary/Chest: Effort normal and breath sounds normal. No respiratory distress. She has no wheezes. She has no rales.   Musculoskeletal:        Right shoulder: She exhibits normal range of motion and no tenderness.        Right ankle: She exhibits normal range of motion and no swelling.        Lumbar back: She exhibits normal range of motion and no tenderness.   Neurological: She is alert and oriented to person, place, and time.   Skin: Skin is warm and dry.   Psychiatric: She has a normal mood and affect.       Assessment/Plan   Kasia was seen today for tingling and numbness.    Diagnoses and all orders for this visit:    Neuropathy    Other orders  -     MethylPREDNISolone (MEDROL, WEI,) 4 MG tablet; Take as directed on package instructions.  -     Crisaborole (EUCRISA) 2 % ointment; Apply 1 application topically 2 (Two) Times a Day.  -     Flucelvax Quad (Vial) =>4 yrs ([id number])

## 2018-10-24 ENCOUNTER — TELEPHONE (OUTPATIENT)
Dept: FAMILY MEDICINE CLINIC | Facility: CLINIC | Age: 58
End: 2018-10-24

## 2018-10-24 DIAGNOSIS — G62.9 NEUROPATHY: Primary | ICD-10-CM

## 2018-10-24 NOTE — TELEPHONE ENCOUNTER
Pt called and said that she saw you on 10/10/2018. She said that she finished the medication you gave her for tingling in her fingers and toes. She said that she is a little better but the tingling is still bothering her. You had mentioned xrays as a next step and was wondering if she can go ahead and get these or does she need to come back in to see you first?

## 2018-10-25 ENCOUNTER — HOSPITAL ENCOUNTER (OUTPATIENT)
Dept: GENERAL RADIOLOGY | Facility: HOSPITAL | Age: 58
Discharge: HOME | End: 2018-10-25
Admitting: NURSE PRACTITIONER

## 2018-10-25 ENCOUNTER — HOSPITAL ENCOUNTER (OUTPATIENT)
Dept: GENERAL RADIOLOGY | Facility: HOSPITAL | Age: 58
Discharge: HOME | End: 2018-10-25

## 2018-10-25 DIAGNOSIS — G62.9 NEUROPATHY: ICD-10-CM

## 2018-10-25 PROCEDURE — 72100 X-RAY EXAM L-S SPINE 2/3 VWS: CPT

## 2018-10-25 PROCEDURE — 72040 X-RAY EXAM NECK SPINE 2-3 VW: CPT

## 2019-07-01 RX ORDER — AMLODIPINE BESYLATE 5 MG/1
TABLET ORAL
Qty: 90 TABLET | Refills: 2 | OUTPATIENT
Start: 2019-07-01

## 2019-07-01 RX ORDER — LEVOTHYROXINE SODIUM 0.07 MG/1
TABLET ORAL
Qty: 90 TABLET | Refills: 2 | OUTPATIENT
Start: 2019-07-01

## 2019-07-09 ENCOUNTER — OFFICE VISIT (OUTPATIENT)
Dept: FAMILY MEDICINE CLINIC | Facility: CLINIC | Age: 59
End: 2019-07-09

## 2019-07-09 VITALS
TEMPERATURE: 98.4 F | WEIGHT: 130.7 LBS | RESPIRATION RATE: 14 BRPM | DIASTOLIC BLOOD PRESSURE: 70 MMHG | SYSTOLIC BLOOD PRESSURE: 144 MMHG | OXYGEN SATURATION: 98 % | HEIGHT: 65 IN | HEART RATE: 75 BPM | BODY MASS INDEX: 21.77 KG/M2

## 2019-07-09 DIAGNOSIS — I10 ESSENTIAL HYPERTENSION: Primary | ICD-10-CM

## 2019-07-09 DIAGNOSIS — E03.9 ACQUIRED HYPOTHYROIDISM: ICD-10-CM

## 2019-07-09 PROCEDURE — 99214 OFFICE O/P EST MOD 30 MIN: CPT | Performed by: NURSE PRACTITIONER

## 2019-07-09 RX ORDER — PHENOL 1.4 %
600 AEROSOL, SPRAY (ML) MUCOUS MEMBRANE DAILY
COMMUNITY
End: 2019-11-20

## 2019-07-09 RX ORDER — ZOSTER VACCINE RECOMBINANT, ADJUVANTED 50 MCG/0.5
KIT INTRAMUSCULAR
Refills: 0 | COMMUNITY
Start: 2019-05-08 | End: 2019-11-20

## 2019-07-09 RX ORDER — MULTIPLE VITAMINS W/ MINERALS TAB 9MG-400MCG
1 TAB ORAL DAILY
COMMUNITY

## 2019-07-09 RX ORDER — HEPATITIS A VACCINE 1440 [IU]/ML
INJECTION, SUSPENSION INTRAMUSCULAR
Refills: 0 | COMMUNITY
Start: 2019-05-08 | End: 2019-11-20

## 2019-07-09 RX ORDER — AMLODIPINE BESYLATE 5 MG/1
5 TABLET ORAL DAILY
Qty: 90 TABLET | Refills: 3 | Status: SHIPPED | OUTPATIENT
Start: 2019-07-09 | End: 2020-07-02 | Stop reason: SDUPTHER

## 2019-07-09 RX ORDER — VITAMIN E 200 UNIT
CAPSULE ORAL
COMMUNITY
End: 2020-11-04

## 2019-07-09 RX ORDER — LEVOTHYROXINE SODIUM 0.07 MG/1
75 TABLET ORAL DAILY
Qty: 90 TABLET | Refills: 3 | Status: SHIPPED | OUTPATIENT
Start: 2019-07-09 | End: 2020-07-02 | Stop reason: SDUPTHER

## 2019-07-09 NOTE — PROGRESS NOTES
Subjective   Kasia Carroll is a 59 y.o. female presents to establish care, currently treated for hypertension and hypothyroid. She states she needs a medication refill. Previously managed by Dr. Sanchez. She denies any current concerns with medication.   Gyn: Dr. Rios, new patient to [facility] in October  This is a new patient/problems to this provider.      Hypertension   This is a chronic problem. The current episode started more than 1 year ago. The problem is unchanged. The problem is controlled. Pertinent negatives include no anxiety, blurred vision, chest pain, headaches, malaise/fatigue, neck pain, orthopnea, palpitations, peripheral edema, PND, shortness of breath or sweats. There are no associated agents to hypertension. Risk factors for coronary artery disease include post-menopausal state. Past treatments include calcium channel blockers. Current antihypertension treatment includes calcium channel blockers. The current treatment provides moderate improvement. There are no compliance problems.    Hypothyroidism   This is a chronic problem. The current episode started more than 1 year ago. The problem occurs daily. The problem has been unchanged. Pertinent negatives include no abdominal pain, anorexia, arthralgias, change in bowel habit, chest pain, chills, congestion, coughing, diaphoresis, fatigue, fever, headaches, joint swelling, myalgias, nausea, neck pain, numbness, rash, sore throat, swollen glands, urinary symptoms, vertigo, visual change, vomiting or weakness. Nothing aggravates the symptoms. Treatments tried: levothyroxine. The treatment provided moderate relief.        The following portions of the patient's history were reviewed and updated as appropriate: allergies, current medications, past family history, past medical history, past social history, past surgical history and problem list.    Review of Systems   Constitutional: Negative for chills, diaphoresis, fatigue, fever and  malaise/fatigue.   HENT: Negative for congestion and sore throat.    Eyes: Negative for blurred vision.   Respiratory: Negative for cough and shortness of breath.    Cardiovascular: Negative for chest pain, palpitations, orthopnea and PND.   Gastrointestinal: Negative for abdominal pain, anorexia, change in bowel habit, nausea and vomiting.   Musculoskeletal: Negative for arthralgias, joint swelling, myalgias and neck pain.   Skin: Negative for rash.   Neurological: Negative for vertigo, weakness, numbness and headaches.       Objective   Physical Exam   Constitutional: She is oriented to person, place, and time. She appears well-developed and well-nourished. No distress.   Neck: Neck supple.   Cardiovascular: Normal rate, regular rhythm and normal heart sounds. Exam reveals no gallop and no friction rub.   No murmur heard.  Pulmonary/Chest: Effort normal and breath sounds normal. No respiratory distress. She has no wheezes. She has no rales.   Abdominal: Soft. Bowel sounds are normal. She exhibits no distension. There is no tenderness.   Neurological: She is alert and oriented to person, place, and time.   Skin: Skin is warm and dry. She is not diaphoretic.   Psychiatric: She has a normal mood and affect.       Assessment/Plan   Kasia was seen today for hypertension, med refill and hypothyroidism.    Diagnoses and all orders for this visit:    Essential hypertension    Acquired hypothyroidism    Other orders  -     levothyroxine (SYNTHROID, LEVOTHROID) 75 MCG tablet; Take 1 tablet by mouth Daily.  -     amLODIPine (NORVASC) 5 MG tablet; Take 1 tablet by mouth Daily.      Will continue current treatment plan  Patient declines labs today  Will obtain at next visit  Follow up in one year, sooner if needed

## 2019-08-28 ENCOUNTER — TELEPHONE (OUTPATIENT)
Dept: OBSTETRICS AND GYNECOLOGY | Facility: CLINIC | Age: 59
End: 2019-08-28

## 2019-08-28 NOTE — TELEPHONE ENCOUNTER
Patient is having breast pain and wanting to know if we can work her in.  She said she missed her last appt because of a death out of town.  She can be reached at [phone].

## 2019-08-30 ENCOUNTER — OFFICE VISIT (OUTPATIENT)
Dept: OBSTETRICS AND GYNECOLOGY | Facility: CLINIC | Age: 59
End: 2019-08-30

## 2019-08-30 VITALS — BODY MASS INDEX: 21.97 KG/M2 | SYSTOLIC BLOOD PRESSURE: 147 MMHG | WEIGHT: 130 LBS | DIASTOLIC BLOOD PRESSURE: 87 MMHG

## 2019-08-30 DIAGNOSIS — N63.10 BREAST MASS, RIGHT: Primary | ICD-10-CM

## 2019-08-30 PROCEDURE — 99214 OFFICE O/P EST MOD 30 MIN: CPT | Performed by: OBSTETRICS & GYNECOLOGY

## 2019-08-30 NOTE — PROGRESS NOTES
Subjective   Kasia Carroll is a 59 y.o. female here for knot on right breast under armpit.     History of Present Illness   Patient is a 59-year-old female that presents complaining of 1 month history of a palpable right breast mass.  Patient states it is mildly tender to palpation.  She denies any abnormal breast discharge.  Patient's last screening mammogram was in October 2018.  She has no personal or family history of breast cancer.  Patient does have a history of breast implants in 1988 and states she had them replaced in 2010.    The following portions of the patient's history were reviewed and updated as appropriate: allergies, current medications, past family history, past medical history, past social history, past surgical history and problem list.    Review of Systems   Genitourinary: Positive for breast lump.   All other systems reviewed and are negative.      Objective   Physical Exam   Constitutional: She appears well-developed and well-nourished.   Pulmonary/Chest: Right breast exhibits mass. Right breast exhibits no inverted nipple, no nipple discharge, no skin change and no tenderness. Left breast exhibits no inverted nipple, no mass, no nipple discharge, no skin change and no tenderness.       Neurological: She is alert.   Psychiatric: She has a normal mood and affect.   Vitals reviewed.        Assessment/Plan   Kasia was seen today for breast mass.    Diagnoses and all orders for this visit:    Breast mass, right  -     Mammo Diagnostic Right With CAD; Future  -     US breast right complete; Future      Patient is noted to have a 1 cm, mobile mass in the right axillary tail.  The area of concern feels like a reactive lymph node, but explained to patient that she still needs evaluation with a diagnostic mammogram and ultrasound.  Orders were placed.  Patient is to follow-up with me in October for annual exam.

## 2019-09-06 ENCOUNTER — APPOINTMENT (OUTPATIENT)
Dept: WOMENS IMAGING | Facility: HOSPITAL | Age: 59
End: 2019-09-06

## 2019-09-06 PROCEDURE — 76641 ULTRASOUND BREAST COMPLETE: CPT | Performed by: RADIOLOGY

## 2019-09-06 PROCEDURE — 77065 DX MAMMO INCL CAD UNI: CPT | Performed by: RADIOLOGY

## 2019-09-06 PROCEDURE — G0279 TOMOSYNTHESIS, MAMMO: HCPCS | Performed by: RADIOLOGY

## 2019-09-06 PROCEDURE — 77061 BREAST TOMOSYNTHESIS UNI: CPT | Performed by: RADIOLOGY

## 2019-10-24 ENCOUNTER — APPOINTMENT (OUTPATIENT)
Dept: WOMENS IMAGING | Facility: HOSPITAL | Age: 59
End: 2019-10-24

## 2019-10-24 PROCEDURE — 77067 SCR MAMMO BI INCL CAD: CPT | Performed by: RADIOLOGY

## 2019-10-24 PROCEDURE — MDREVIEWSP: Performed by: RADIOLOGY

## 2019-10-24 PROCEDURE — 77063 BREAST TOMOSYNTHESIS BI: CPT | Performed by: RADIOLOGY

## 2019-10-25 ENCOUNTER — OFFICE VISIT (OUTPATIENT)
Dept: OBSTETRICS AND GYNECOLOGY | Facility: CLINIC | Age: 59
End: 2019-10-25

## 2019-10-25 VITALS
HEIGHT: 65 IN | SYSTOLIC BLOOD PRESSURE: 145 MMHG | BODY MASS INDEX: 22.33 KG/M2 | DIASTOLIC BLOOD PRESSURE: 84 MMHG | WEIGHT: 134 LBS

## 2019-10-25 DIAGNOSIS — Z01.419 ENCOUNTER FOR GYNECOLOGICAL EXAMINATION: Primary | ICD-10-CM

## 2019-10-25 PROCEDURE — 99396 PREV VISIT EST AGE 40-64: CPT | Performed by: OBSTETRICS & GYNECOLOGY

## 2019-10-25 RX ORDER — ESTRADIOL 0.1 MG/G
0.5 CREAM VAGINAL 2 TIMES WEEKLY
Qty: 42.5 G | Refills: 2 | Status: SHIPPED | OUTPATIENT
Start: 2019-10-28 | End: 2019-10-30 | Stop reason: SDUPTHER

## 2019-10-25 NOTE — PROGRESS NOTES
Subjective   Kasia Carroll is a 59 y.o. female. Pt here for annual. Last Pap-8/10/2017 NIL Last Mammo-10/24/19 Last colonoscopy-repeat in 2021  DEXA 2018--osteopenia    History of Present Illness   Patient is a 59-year-old female that presents for annual gynecological exam.  Patient has no complaints.  She had her screening mammogram yesterday and is currently up-to-date on her screening Pap smear, colonoscopy, and bone density scan.    The following portions of the patient's history were reviewed and updated as appropriate: allergies, current medications, past family history, past medical history, past social history, past surgical history and problem list.    Review of Systems   Genitourinary: Negative for menstrual problem.   All other systems reviewed and are negative.      Objective   Physical Exam  Physical Exam   Constitutional: She appears well-developed and well-nourished.   Cardiovascular: Normal rate and regular rhythm.    Pulmonary/Chest: Effort normal and breath sounds normal. Right breast exhibits no inverted nipple, no mass, no nipple discharge, no skin change and no tenderness. Left breast exhibits no inverted nipple, no mass, no nipple discharge, no skin change and no tenderness.   Abdominal: Soft. She exhibits no distension. There is no tenderness.   Genitourinary: Vagina normal and uterus normal. There is no rash, tenderness, lesion or injury on the right labia. There is no rash, tenderness, lesion or injury on the left labia. Cervix exhibits no motion tenderness, no discharge and no friability. Right adnexum displays no mass, no tenderness and no fullness. Left adnexum displays no mass, no tenderness and no fullness.   Neurological: She is alert.   Psychiatric: She has a normal mood and affect.   Vitals reviewed.      Assessment/Plan   Kasia was seen today for gynecologic exam.    Diagnoses and all orders for this visit:    Encounter for gynecological examination    Other orders  -      estradiol (ESTRACE) 0.1 MG/GM vaginal cream; Insert 0.5 g into the vagina 2 (Two) Times a Week. Use 1 gm twice weekly    Patient was counseled on calcium and vitamin D supplementation for bone health.  Recommend repeating bone density scan next year.  Patient was counseled on monthly self breast exams and yearly screening mammograms for breast health.  She will follow-up in 1 year for next annual exam or sooner if needed.

## 2019-10-30 RX ORDER — ESTRADIOL 0.1 MG/G
1 CREAM VAGINAL 2 TIMES WEEKLY
Qty: 42.5 G | Refills: 2 | Status: SHIPPED | OUTPATIENT
Start: 2019-10-31 | End: 2020-11-04 | Stop reason: SDUPTHER

## 2019-11-20 ENCOUNTER — OFFICE VISIT (OUTPATIENT)
Dept: FAMILY MEDICINE CLINIC | Facility: CLINIC | Age: 59
End: 2019-11-20

## 2019-11-20 VITALS
HEIGHT: 65 IN | HEART RATE: 70 BPM | DIASTOLIC BLOOD PRESSURE: 74 MMHG | SYSTOLIC BLOOD PRESSURE: 138 MMHG | TEMPERATURE: 98.1 F | OXYGEN SATURATION: 98 % | WEIGHT: 132.2 LBS | BODY MASS INDEX: 22.02 KG/M2

## 2019-11-20 DIAGNOSIS — Z13.220 SCREENING FOR LIPOID DISORDERS: ICD-10-CM

## 2019-11-20 DIAGNOSIS — G62.9 NEUROPATHY: ICD-10-CM

## 2019-11-20 DIAGNOSIS — Z00.00 ANNUAL PHYSICAL EXAM: Primary | ICD-10-CM

## 2019-11-20 DIAGNOSIS — Z13.21 ENCOUNTER FOR VITAMIN DEFICIENCY SCREENING: ICD-10-CM

## 2019-11-20 DIAGNOSIS — E03.9 ACQUIRED HYPOTHYROIDISM: ICD-10-CM

## 2019-11-20 DIAGNOSIS — Z11.59 ENCOUNTER FOR HEPATITIS C SCREENING TEST FOR LOW RISK PATIENT: ICD-10-CM

## 2019-11-20 DIAGNOSIS — I10 ESSENTIAL HYPERTENSION: ICD-10-CM

## 2019-11-20 PROCEDURE — 99396 PREV VISIT EST AGE 40-64: CPT | Performed by: NURSE PRACTITIONER

## 2019-11-20 NOTE — PROGRESS NOTES
"Subjective   Kasia Carroll is a 59 y.o. female   who presents for   Chief Complaint   Patient presents with   • Annual Exam     physical   • Hypertension           /74   Pulse 70   Temp 98.1 °F (36.7 °C)   Ht 163.8 cm (64.5\")   Wt 60 kg (132 lb 3.2 oz)   SpO2 98%   BMI 22.34 kg/m²       History of Present Illness   Hypertension   This is a chronic problem. The current episode started more than 1 year ago. The problem is unchanged. The problem is controlled. Pertinent negatives include no anxiety, blurred vision, chest pain, headaches, malaise/fatigue, neck pain, orthopnea, palpitations, peripheral edema, PND, shortness of breath or sweats. There are no associated agents to hypertension. Risk factors for coronary artery disease include post-menopausal state and dyslipidemia. Past treatments include calcium channel blockers. Current antihypertension treatment includes calcium channel blockers. The current treatment provides moderate improvement. There are no compliance problems.    Hypothyroidism   This is a chronic problem. The current episode started more than 1 year ago. The problem occurs daily. The problem has been unchanged. Associated symptoms include fatigue (intermittent). Pertinent negatives include no abdominal pain, anorexia, arthralgias, change in bowel habit, chest pain, chills, congestion, coughing, diaphoresis, fever, headaches, joint swelling, myalgias, nausea, neck pain, numbness, rash, sore throat, swollen glands, urinary symptoms, vertigo, visual change, vomiting or weakness. Nothing aggravates the symptoms. She has tried nothing for the symptoms. The treatment provided no relief.       The following portions of the patient's history were reviewed and updated as appropriate: allergies, current medications, past family history, past medical history, past social history, past surgical history and problem list.    Review of Systems  Review of Systems   Constitutional: Positive for fatigue " (intermittent). Negative for chills, diaphoresis, fever and malaise/fatigue.   HENT: Negative for congestion and sore throat.    Eyes: Negative for blurred vision.   Respiratory: Negative for cough and shortness of breath.    Cardiovascular: Negative for chest pain, palpitations, orthopnea and PND.   Gastrointestinal: Negative for abdominal pain, anorexia, change in bowel habit, nausea and vomiting.   Musculoskeletal: Negative for arthralgias, joint swelling, myalgias and neck pain.   Skin: Negative for rash.   Neurological: Negative for vertigo, weakness, numbness and headaches.       Objective   Physical Exam  Physical Exam   Constitutional: She is oriented to person, place, and time. She appears well-developed and well-nourished. No distress.   HENT:   Head: Normocephalic and atraumatic.   Right Ear: Tympanic membrane, external ear and ear canal normal.   Left Ear: Tympanic membrane, external ear and ear canal normal.   Nose: Nose normal.   Mouth/Throat: Uvula is midline, oropharynx is clear and moist and mucous membranes are normal. No oropharyngeal exudate.   Neck: Neck supple.   Cardiovascular: Normal rate, regular rhythm and normal heart sounds. Exam reveals no gallop and no friction rub.   No murmur heard.  Pulmonary/Chest: Effort normal and breath sounds normal. No respiratory distress. She has no wheezes. She has no rales.   Abdominal: Soft. Bowel sounds are normal. She exhibits no distension. There is no tenderness.   Neurological: She is alert and oriented to person, place, and time.   Skin: Skin is warm and dry. She is not diaphoretic.   Psychiatric: She has a normal mood and affect.   Vitals reviewed.        Assessment/Plan   Kasia was seen today for annual exam and hypertension.    Diagnoses and all orders for this visit:    Annual physical exam    Essential hypertension  -     Comprehensive metabolic panel  -     CBC & Differential    Screening for lipoid disorders  -     Lipid Panel With LDL / HDL  Ratio    Acquired hypothyroidism  -     TSH Rfx On Abnormal To Free T4    Neuropathy  -     Vitamin B12    Encounter for vitamin deficiency screening  -     Vitamin B12  -     Vitamin D 25 hydroxy    Encounter for hepatitis C screening test for low risk patient  -     Hepatitis C antibody    will check labs today  Routine screening plus b12 deficiency with increased neuropathy hands/feet  Follow up in six months, sooner if needed  Up to date on immunizations, heatlh maintenance reviewed with patient  Discussed exercise and healthy diet choices  Discussed DASH diet to reduce BP  Discussed ways to lower lipids, previous levels increased

## 2019-11-21 LAB
25(OH)D3+25(OH)D2 SERPL-MCNC: 44.1 NG/ML (ref 30–100)
ALBUMIN SERPL-MCNC: 5.5 G/DL (ref 3.5–5.2)
ALBUMIN/GLOB SERPL: 2.4 G/DL
ALP SERPL-CCNC: 51 U/L (ref 39–117)
ALT SERPL-CCNC: 37 U/L (ref 1–33)
AST SERPL-CCNC: 49 U/L (ref 1–32)
BASOPHILS # BLD AUTO: 0.01 10*3/MM3 (ref 0–0.2)
BASOPHILS NFR BLD AUTO: 0.2 % (ref 0–1.5)
BILIRUB SERPL-MCNC: 0.8 MG/DL (ref 0.2–1.2)
BUN SERPL-MCNC: 7 MG/DL (ref 8–23)
BUN/CREAT SERPL: 11.3 (ref 7–25)
CALCIUM SERPL-MCNC: 10 MG/DL (ref 8.6–10.5)
CHLORIDE SERPL-SCNC: 92 MMOL/L (ref 98–107)
CHOLEST SERPL-MCNC: 273 MG/DL (ref 0–200)
CO2 SERPL-SCNC: 26.9 MMOL/L (ref 22–29)
CREAT SERPL-MCNC: 0.62 MG/DL (ref 0.57–1)
EOSINOPHIL # BLD AUTO: 0.07 10*3/MM3 (ref 0–0.4)
EOSINOPHIL NFR BLD AUTO: 1.2 % (ref 0.3–6.2)
ERYTHROCYTE [DISTWIDTH] IN BLOOD BY AUTOMATED COUNT: 12.5 % (ref 12.3–15.4)
GLOBULIN SER CALC-MCNC: 2.3 GM/DL
GLUCOSE SERPL-MCNC: 115 MG/DL (ref 65–99)
HCT VFR BLD AUTO: 41.8 % (ref 34–46.6)
HDLC SERPL-MCNC: 103 MG/DL (ref 40–60)
HGB BLD-MCNC: 14.3 G/DL (ref 12–15.9)
IMM GRANULOCYTES # BLD AUTO: 0.01 10*3/MM3 (ref 0–0.05)
IMM GRANULOCYTES NFR BLD AUTO: 0.2 % (ref 0–0.5)
LDLC SERPL CALC-MCNC: 157 MG/DL (ref 0–100)
LDLC/HDLC SERPL: 1.52 {RATIO}
LYMPHOCYTES # BLD AUTO: 0.74 10*3/MM3 (ref 0.7–3.1)
LYMPHOCYTES NFR BLD AUTO: 13.1 % (ref 19.6–45.3)
MCH RBC QN AUTO: 33.3 PG (ref 26.6–33)
MCHC RBC AUTO-ENTMCNC: 34.2 G/DL (ref 31.5–35.7)
MCV RBC AUTO: 97.4 FL (ref 79–97)
MONOCYTES # BLD AUTO: 0.57 10*3/MM3 (ref 0.1–0.9)
MONOCYTES NFR BLD AUTO: 10.1 % (ref 5–12)
NEUTROPHILS # BLD AUTO: 4.27 10*3/MM3 (ref 1.7–7)
NEUTROPHILS NFR BLD AUTO: 75.2 % (ref 42.7–76)
NRBC BLD AUTO-RTO: 0 /100 WBC (ref 0–0.2)
PLATELET # BLD AUTO: 234 10*3/MM3 (ref 140–450)
POTASSIUM SERPL-SCNC: 3.8 MMOL/L (ref 3.5–5.2)
PROT SERPL-MCNC: 7.8 G/DL (ref 6–8.5)
RBC # BLD AUTO: 4.29 10*6/MM3 (ref 3.77–5.28)
SODIUM SERPL-SCNC: 133 MMOL/L (ref 136–145)
TRIGL SERPL-MCNC: 66 MG/DL (ref 0–150)
TSH SERPL DL<=0.005 MIU/L-ACNC: 1.3 UIU/ML (ref 0.27–4.2)
VIT B12 SERPL-MCNC: 775 PG/ML (ref 211–946)
VLDLC SERPL CALC-MCNC: 13.2 MG/DL
WBC # BLD AUTO: 5.67 10*3/MM3 (ref 3.4–10.8)

## 2020-07-02 RX ORDER — AMLODIPINE BESYLATE 5 MG/1
5 TABLET ORAL DAILY
Qty: 90 TABLET | Refills: 1 | Status: SHIPPED | OUTPATIENT
Start: 2020-07-02 | End: 2020-12-02 | Stop reason: SDUPTHER

## 2020-07-02 RX ORDER — LEVOTHYROXINE SODIUM 0.07 MG/1
75 TABLET ORAL DAILY
Qty: 90 TABLET | Refills: 1 | Status: SHIPPED | OUTPATIENT
Start: 2020-07-02 | End: 2020-12-02 | Stop reason: SDUPTHER

## 2020-10-26 ENCOUNTER — APPOINTMENT (OUTPATIENT)
Dept: WOMENS IMAGING | Facility: HOSPITAL | Age: 60
End: 2020-10-26

## 2020-10-26 PROCEDURE — 77067 SCR MAMMO BI INCL CAD: CPT | Performed by: RADIOLOGY

## 2020-10-26 PROCEDURE — 77063 BREAST TOMOSYNTHESIS BI: CPT | Performed by: RADIOLOGY

## 2020-11-04 ENCOUNTER — OFFICE VISIT (OUTPATIENT)
Dept: OBSTETRICS AND GYNECOLOGY | Facility: CLINIC | Age: 60
End: 2020-11-04

## 2020-11-04 VITALS
HEART RATE: 87 BPM | DIASTOLIC BLOOD PRESSURE: 94 MMHG | SYSTOLIC BLOOD PRESSURE: 155 MMHG | WEIGHT: 133 LBS | BODY MASS INDEX: 22.48 KG/M2

## 2020-11-04 DIAGNOSIS — Z78.0 POSTMENOPAUSAL STATE: ICD-10-CM

## 2020-11-04 DIAGNOSIS — Z01.419 ENCOUNTER FOR GYNECOLOGICAL EXAMINATION: Primary | ICD-10-CM

## 2020-11-04 PROCEDURE — 99396 PREV VISIT EST AGE 40-64: CPT | Performed by: OBSTETRICS & GYNECOLOGY

## 2020-11-04 RX ORDER — ESTRADIOL 0.1 MG/G
1 CREAM VAGINAL 2 TIMES WEEKLY
Qty: 42.5 G | Refills: 2 | Status: SHIPPED | OUTPATIENT
Start: 2020-11-05 | End: 2021-03-16 | Stop reason: SDUPTHER

## 2020-11-04 NOTE — PROGRESS NOTES
Subjective   Kasia Carroll is a 60 y.o. female here for annual exam. Pap- 2017 Mamm-2020 Colonoscopy-2011  DEXA--2018, osteopenia    History of Present Illness   Patient is a 60-year-old female that presents for annual gynecological exam.  Patient has no complaints.  She reports that she continues to occasionally use vaginal estrogen for symptoms.  She denies any vaginal bleeding.  Patient is due in January for screening colonoscopy and is due for repeat bone density scan this year.  [facility]    The following portions of the patient's history were reviewed and updated as appropriate: allergies, current medications, past family history, past medical history, past social history, past surgical history and problem list.    Review of Systems   Genitourinary: Negative for menstrual problem and pelvic pain.   All other systems reviewed and are negative.      Objective   Physical Exam  Vitals signs reviewed. Exam conducted with a chaperone present.   Constitutional:       Appearance: She is well-developed.   Cardiovascular:      Rate and Rhythm: Normal rate and regular rhythm.   Pulmonary:      Effort: Pulmonary effort is normal.      Breath sounds: Normal breath sounds.   Chest:      Breasts:         Right: No inverted nipple, mass, nipple discharge, skin change or tenderness.         Left: No inverted nipple, mass, nipple discharge, skin change or tenderness.   Abdominal:      General: There is no distension.      Palpations: Abdomen is soft.      Tenderness: There is no abdominal tenderness.   Genitourinary:     Labia:         Right: No rash, tenderness, lesion or injury.         Left: No rash, tenderness, lesion or injury.       Vagina: Normal.      Cervix: Normal.      Uterus: Normal.       Adnexa:         Right: No mass, tenderness or fullness.          Left: No mass, tenderness or fullness.     Neurological:      Mental Status: She is alert.            Assessment/Plan   Diagnoses and all orders for this visit:    1. Encounter for gynecological examination (Primary)  -     IGP, Aptima HPV, Rfx 16 / 18,45    2. Postmenopausal state  -     DEXA Bone Density Axial; Future  -     estradiol (ESTRACE) 0.1 MG/GM vaginal cream; Insert 1 g into the vagina 2 (Two) Times a Week.  Dispense: 42.5 g; Refill: 2      Patient was counseled on monthly self breast exams and yearly screening mammograms for breast health.  She was counseled on calcium and vitamin D for bone health.  Bone density scan was ordered for this year.  Patient may follow-up in 1 year for annual exam or sooner if needed.

## 2020-11-10 LAB
CYTOLOGIST CVX/VAG CYTO: NORMAL
CYTOLOGY CVX/VAG DOC CYTO: NORMAL
CYTOLOGY CVX/VAG DOC THIN PREP: NORMAL
DX ICD CODE: NORMAL
HIV 1 & 2 AB SER-IMP: NORMAL
HPV I/H RISK 4 DNA CVX QL PROBE+SIG AMP: NEGATIVE
Lab: NORMAL
OTHER STN SPEC: NORMAL
RECOM F/U CVX/VAG CYTO: NORMAL
STAT OF ADQ CVX/VAG CYTO-IMP: NORMAL

## 2020-12-02 ENCOUNTER — OFFICE VISIT (OUTPATIENT)
Dept: FAMILY MEDICINE CLINIC | Facility: CLINIC | Age: 60
End: 2020-12-02

## 2020-12-02 VITALS
HEART RATE: 72 BPM | HEIGHT: 65 IN | BODY MASS INDEX: 22.76 KG/M2 | RESPIRATION RATE: 16 BRPM | SYSTOLIC BLOOD PRESSURE: 140 MMHG | WEIGHT: 136.6 LBS | TEMPERATURE: 97.5 F | DIASTOLIC BLOOD PRESSURE: 86 MMHG | OXYGEN SATURATION: 99 %

## 2020-12-02 DIAGNOSIS — Z00.00 ANNUAL PHYSICAL EXAM: Primary | ICD-10-CM

## 2020-12-02 DIAGNOSIS — E03.9 ACQUIRED HYPOTHYROIDISM: ICD-10-CM

## 2020-12-02 DIAGNOSIS — I10 ESSENTIAL HYPERTENSION: ICD-10-CM

## 2020-12-02 PROCEDURE — 99396 PREV VISIT EST AGE 40-64: CPT | Performed by: NURSE PRACTITIONER

## 2020-12-02 RX ORDER — AMLODIPINE BESYLATE 5 MG/1
5 TABLET ORAL DAILY
Qty: 90 TABLET | Refills: 1 | Status: SHIPPED | OUTPATIENT
Start: 2020-12-02 | End: 2021-07-08 | Stop reason: SDUPTHER

## 2020-12-02 RX ORDER — LEVOTHYROXINE SODIUM 0.07 MG/1
75 TABLET ORAL DAILY
Qty: 90 TABLET | Refills: 1 | Status: SHIPPED | OUTPATIENT
Start: 2020-12-02 | End: 2021-07-08 | Stop reason: SDUPTHER

## 2020-12-02 NOTE — PROGRESS NOTES
"Subjective   Kasia Carroll is a 60 y.o. female   who presents for   Chief Complaint   Patient presents with   • Annual Exam     bp is increased in office, 120/80s  Working out twice weekly with a  and enjoying the work outs    /86   Pulse 72   Temp 97.5 °F (36.4 °C)   Resp 16   Ht 163.8 cm (64.5\")   Wt 62 kg (136 lb 9.6 oz)   SpO2 99%   BMI 23.09 kg/m²       History of Present Illness   Hypothyroidism  This is a chronic problem. The current episode started more than 1 year ago. The problem occurs daily. The problem has been unchanged. Pertinent negatives include no abdominal pain, anorexia, arthralgias, change in bowel habit, chest pain, chills, congestion, coughing, diaphoresis, fatigue, fever, headaches, joint swelling, myalgias, nausea, neck pain, numbness, rash, sore throat, swollen glands, urinary symptoms, vertigo, visual change, vomiting or weakness. Nothing aggravates the symptoms. Treatments tried: levothyroxine. The treatment provided moderate relief.   Hypertension  This is a chronic problem. The current episode started more than 1 year ago. The problem is unchanged. The problem is controlled. Pertinent negatives include no anxiety, blurred vision, chest pain, headaches, malaise/fatigue, neck pain, orthopnea, palpitations, peripheral edema, PND, shortness of breath or sweats. There are no associated agents to hypertension. Past treatments include calcium channel blockers. Current antihypertension treatment includes calcium channel blockers. The current treatment provides moderate improvement. There are no compliance problems.        The following portions of the patient's history were reviewed and updated as appropriate: allergies, current medications, past family history, past medical history, past social history, past surgical history and problem list.    Review of Systems  Review of Systems   Constitutional: Negative.  Negative for chills, diaphoresis, fatigue, fever and " malaise/fatigue.   HENT: Negative.  Negative for congestion and sore throat.    Eyes: Negative.  Negative for blurred vision.   Respiratory: Negative.  Negative for cough and shortness of breath.    Cardiovascular: Negative.  Negative for chest pain, palpitations, orthopnea and PND.   Gastrointestinal: Negative.  Negative for abdominal pain, anorexia, change in bowel habit, nausea and vomiting.   Endocrine: Negative.    Genitourinary: Negative.    Musculoskeletal: Negative.  Negative for arthralgias, joint swelling, myalgias and neck pain.   Skin: Negative.  Negative for rash.   Allergic/Immunologic: Negative.    Neurological: Negative.  Negative for vertigo, weakness, numbness and headaches.   Hematological: Negative.    Psychiatric/Behavioral: Negative.        Objective   Physical Exam  Physical Exam  Vitals signs reviewed.   Constitutional:       General: She is not in acute distress.     Appearance: Normal appearance. She is well-developed. She is not diaphoretic.   HENT:      Head: Normocephalic and atraumatic.      Right Ear: Tympanic membrane, ear canal and external ear normal.      Left Ear: Tympanic membrane, ear canal and external ear normal.      Nose: Nose normal.      Mouth/Throat:      Pharynx: Uvula midline. No oropharyngeal exudate.   Neck:      Musculoskeletal: Neck supple.   Cardiovascular:      Rate and Rhythm: Normal rate and regular rhythm.      Heart sounds: Normal heart sounds. No murmur. No friction rub. No gallop.    Pulmonary:      Effort: Pulmonary effort is normal. No respiratory distress.      Breath sounds: Normal breath sounds. No wheezing or rales.   Abdominal:      General: Bowel sounds are normal. There is no distension.      Palpations: Abdomen is soft.      Tenderness: There is no abdominal tenderness.   Skin:     General: Skin is warm and dry.   Neurological:      Mental Status: She is alert and oriented to person, place, and time.           Assessment/Plan   Diagnoses and all  orders for this visit:    1. Annual physical exam (Primary)  -     CBC & Differential  -     Comprehensive Metabolic Panel  -     Lipid Panel With LDL / HDL Ratio  -     TSH Rfx On Abnormal To Free T4    2. Acquired hypothyroidism    3. Essential hypertension    Other orders  -     levothyroxine (SYNTHROID, LEVOTHROID) 75 MCG tablet; Take 1 tablet by mouth Daily.  Dispense: 90 tablet; Refill: 1  -     amLODIPine (NORVASC) 5 MG tablet; Take 1 tablet by mouth Daily.  Dispense: 90 tablet; Refill: 1      bp is increased in the office today. Patient is checking her bp regularly at home and it is averaging 120/80s consistently  Denies white coat syndrome  Will continue current dose of amlodipine and have patient continue to monitor her bp  Labs today  Does report fasting  utd on flu  utd on pap/mammo    Information/counseling provided to the patient regarding periodic abimael maintenance recommendations, including but not limited to immunizations, diet/exercise/healthy lifestyle, laboratory, and other screenings. BMI is discussed. Appropriate exercise, diet, and weight plans are discussed.

## 2020-12-03 LAB
ALBUMIN SERPL-MCNC: 5.3 G/DL (ref 3.8–4.8)
ALBUMIN/GLOB SERPL: 2 {RATIO} (ref 1.2–2.2)
ALP SERPL-CCNC: 60 IU/L (ref 39–117)
ALT SERPL-CCNC: 31 IU/L (ref 0–32)
AST SERPL-CCNC: 40 IU/L (ref 0–40)
BASOPHILS # BLD AUTO: 0 X10E3/UL (ref 0–0.2)
BASOPHILS NFR BLD AUTO: 0 %
BILIRUB SERPL-MCNC: 1 MG/DL (ref 0–1.2)
BUN SERPL-MCNC: 8 MG/DL (ref 8–27)
BUN/CREAT SERPL: 12 (ref 12–28)
CALCIUM SERPL-MCNC: 10.3 MG/DL (ref 8.7–10.3)
CHLORIDE SERPL-SCNC: 91 MMOL/L (ref 96–106)
CHOLEST SERPL-MCNC: 302 MG/DL (ref 100–199)
CO2 SERPL-SCNC: 24 MMOL/L (ref 20–29)
CREAT SERPL-MCNC: 0.68 MG/DL (ref 0.57–1)
EOSINOPHIL # BLD AUTO: 0.1 X10E3/UL (ref 0–0.4)
EOSINOPHIL NFR BLD AUTO: 1 %
ERYTHROCYTE [DISTWIDTH] IN BLOOD BY AUTOMATED COUNT: 12.1 % (ref 11.7–15.4)
GLOBULIN SER CALC-MCNC: 2.6 G/DL (ref 1.5–4.5)
GLUCOSE SERPL-MCNC: 118 MG/DL (ref 65–99)
HCT VFR BLD AUTO: 41.3 % (ref 34–46.6)
HDLC SERPL-MCNC: 117 MG/DL
HGB BLD-MCNC: 14.4 G/DL (ref 11.1–15.9)
IMM GRANULOCYTES # BLD AUTO: 0 X10E3/UL (ref 0–0.1)
IMM GRANULOCYTES NFR BLD AUTO: 0 %
LDLC SERPL CALC-MCNC: 177 MG/DL (ref 0–99)
LDLC/HDLC SERPL: 1.5 RATIO (ref 0–3.2)
LYMPHOCYTES # BLD AUTO: 1 X10E3/UL (ref 0.7–3.1)
LYMPHOCYTES NFR BLD AUTO: 20 %
MCH RBC QN AUTO: 33.8 PG (ref 26.6–33)
MCHC RBC AUTO-ENTMCNC: 34.9 G/DL (ref 31.5–35.7)
MCV RBC AUTO: 97 FL (ref 79–97)
MONOCYTES # BLD AUTO: 0.6 X10E3/UL (ref 0.1–0.9)
MONOCYTES NFR BLD AUTO: 13 %
NEUTROPHILS # BLD AUTO: 3.4 X10E3/UL (ref 1.4–7)
NEUTROPHILS NFR BLD AUTO: 66 %
PLATELET # BLD AUTO: 237 X10E3/UL (ref 150–450)
POTASSIUM SERPL-SCNC: 4 MMOL/L (ref 3.5–5.2)
PROT SERPL-MCNC: 7.9 G/DL (ref 6–8.5)
RBC # BLD AUTO: 4.26 X10E6/UL (ref 3.77–5.28)
SODIUM SERPL-SCNC: 133 MMOL/L (ref 134–144)
TRIGL SERPL-MCNC: 60 MG/DL (ref 0–149)
TSH SERPL DL<=0.005 MIU/L-ACNC: 1.94 UIU/ML (ref 0.45–4.5)
VLDLC SERPL CALC-MCNC: 8 MG/DL (ref 5–40)
WBC # BLD AUTO: 5.1 X10E3/UL (ref 3.4–10.8)

## 2020-12-07 ENCOUNTER — TELEPHONE (OUTPATIENT)
Dept: FAMILY MEDICINE CLINIC | Facility: CLINIC | Age: 60
End: 2020-12-07

## 2021-03-16 DIAGNOSIS — Z78.0 POSTMENOPAUSAL STATE: ICD-10-CM

## 2021-03-16 RX ORDER — ESTRADIOL 0.1 MG/G
1 CREAM VAGINAL 2 TIMES WEEKLY
Qty: 42.5 G | Refills: 2 | Status: SHIPPED | OUTPATIENT
Start: 2021-03-18 | End: 2021-03-16 | Stop reason: SDUPTHER

## 2021-03-16 RX ORDER — ESTRADIOL 0.1 MG/G
1 CREAM VAGINAL 2 TIMES WEEKLY
Qty: 42.5 G | Refills: 2 | Status: SHIPPED | OUTPATIENT
Start: 2021-03-18 | End: 2022-11-29 | Stop reason: SDUPTHER

## 2021-03-22 ENCOUNTER — BULK ORDERING (OUTPATIENT)
Dept: CASE MANAGEMENT | Facility: OTHER | Age: 61
End: 2021-03-22

## 2021-03-22 DIAGNOSIS — Z23 IMMUNIZATION DUE: ICD-10-CM

## 2021-03-23 DIAGNOSIS — E78.2 MIXED HYPERLIPIDEMIA: Primary | ICD-10-CM

## 2021-03-23 DIAGNOSIS — Z79.899 MEDICATION MANAGEMENT: ICD-10-CM

## 2021-03-23 DIAGNOSIS — E78.2 MIXED HYPERLIPIDEMIA: ICD-10-CM

## 2021-04-28 ENCOUNTER — APPOINTMENT (OUTPATIENT)
Dept: WOMENS IMAGING | Facility: HOSPITAL | Age: 61
End: 2021-04-28

## 2021-04-28 PROCEDURE — 77080 DXA BONE DENSITY AXIAL: CPT | Performed by: RADIOLOGY

## 2021-05-03 DIAGNOSIS — Z78.0 POSTMENOPAUSAL STATE: ICD-10-CM

## 2021-05-04 ENCOUNTER — TELEPHONE (OUTPATIENT)
Dept: OBSTETRICS AND GYNECOLOGY | Facility: CLINIC | Age: 61
End: 2021-05-04

## 2021-05-04 NOTE — TELEPHONE ENCOUNTER
----- Message from Kandi العراقي MD sent at 5/4/2021  3:13 PM EDT -----  Please let patient know that her bone density scan showed osteopenia but her bone density has slightly improved compared to 2 years ago.  She should continue with Vitamin D supplementation, calcium rich diet, and weight bearing exercises.

## 2021-05-04 NOTE — TELEPHONE ENCOUNTER
Patient returned your call. Informed her of dexa results and to continue Vit D, calcium rich diet,and weight bearing exercises.

## 2021-05-20 LAB
ALBUMIN SERPL-MCNC: 4.7 G/DL (ref 3.5–5.2)
ALBUMIN/GLOB SERPL: 2 G/DL
ALP SERPL-CCNC: 51 U/L (ref 39–117)
ALT SERPL-CCNC: 20 U/L (ref 1–33)
AST SERPL-CCNC: 27 U/L (ref 1–32)
BILIRUB SERPL-MCNC: 0.5 MG/DL (ref 0–1.2)
BUN SERPL-MCNC: 5 MG/DL (ref 8–23)
BUN/CREAT SERPL: 8.9 (ref 7–25)
CALCIUM SERPL-MCNC: 10 MG/DL (ref 8.6–10.5)
CHLORIDE SERPL-SCNC: 94 MMOL/L (ref 98–107)
CHOLEST SERPL-MCNC: 240 MG/DL (ref 0–200)
CO2 SERPL-SCNC: 27.7 MMOL/L (ref 22–29)
CREAT SERPL-MCNC: 0.56 MG/DL (ref 0.57–1)
GLOBULIN SER CALC-MCNC: 2.3 GM/DL
GLUCOSE SERPL-MCNC: 105 MG/DL (ref 65–99)
HDLC SERPL-MCNC: 95 MG/DL (ref 40–60)
LDLC SERPL CALC-MCNC: 135 MG/DL (ref 0–100)
LDLC/HDLC SERPL: 1.4 {RATIO}
POTASSIUM SERPL-SCNC: 4.8 MMOL/L (ref 3.5–5.2)
PROT SERPL-MCNC: 7 G/DL (ref 6–8.5)
SODIUM SERPL-SCNC: 132 MMOL/L (ref 136–145)
TRIGL SERPL-MCNC: 58 MG/DL (ref 0–150)
VLDLC SERPL CALC-MCNC: 10 MG/DL (ref 5–40)

## 2021-07-08 ENCOUNTER — OFFICE VISIT (OUTPATIENT)
Dept: FAMILY MEDICINE CLINIC | Facility: CLINIC | Age: 61
End: 2021-07-08

## 2021-07-08 VITALS
TEMPERATURE: 96.4 F | HEIGHT: 65 IN | HEART RATE: 67 BPM | SYSTOLIC BLOOD PRESSURE: 140 MMHG | DIASTOLIC BLOOD PRESSURE: 82 MMHG | OXYGEN SATURATION: 98 % | BODY MASS INDEX: 21.91 KG/M2 | WEIGHT: 131.5 LBS | RESPIRATION RATE: 16 BRPM

## 2021-07-08 DIAGNOSIS — J06.9 ACUTE URI: Primary | ICD-10-CM

## 2021-07-08 PROCEDURE — 99213 OFFICE O/P EST LOW 20 MIN: CPT | Performed by: NURSE PRACTITIONER

## 2021-07-08 RX ORDER — AMLODIPINE BESYLATE 5 MG/1
5 TABLET ORAL DAILY
Qty: 90 TABLET | Refills: 1 | Status: SHIPPED | OUTPATIENT
Start: 2021-07-08 | End: 2021-12-07 | Stop reason: SDUPTHER

## 2021-07-08 RX ORDER — DOXYCYCLINE 100 MG/1
100 CAPSULE ORAL EVERY 12 HOURS SCHEDULED
Qty: 20 CAPSULE | Refills: 0 | Status: SHIPPED | OUTPATIENT
Start: 2021-07-08 | End: 2021-10-06

## 2021-07-08 RX ORDER — LEVOTHYROXINE SODIUM 0.07 MG/1
75 TABLET ORAL DAILY
Qty: 90 TABLET | Refills: 1 | Status: SHIPPED | OUTPATIENT
Start: 2021-07-08 | End: 2021-12-07 | Stop reason: SDUPTHER

## 2021-07-08 RX ORDER — BENZONATATE 100 MG/1
200 CAPSULE ORAL 3 TIMES DAILY PRN
Qty: 60 CAPSULE | Refills: 0 | Status: SHIPPED | OUTPATIENT
Start: 2021-07-08 | End: 2021-10-06

## 2021-07-08 NOTE — PROGRESS NOTES
"Chief Complaint  Cough (x1wk) and URI    Subjective          Kasia Carroll presents to [facility]  Presents with > one week history runny nose, nasal congestion, headache. States she was extremely run down. Hoarseness. Then went downhill. Last two nights without cough, taking otc cough medication every 4 hours, increased fatigue. Taking 24 hour allergy medication. Denies sinus pain. Diarrhea in past few days. Nasal congestion, clear to green. Denies shortness of breath or wheezing. Cough, nonproductive.     Watched a 2 year old for four days, parents in hospital with twins    Cough  This is a new problem. The current episode started in the past 7 days. The problem has been gradually worsening. The problem occurs every few hours. The cough is productive of sputum. Associated symptoms include chills, myalgias, postnasal drip, rhinorrhea, a sore throat and sweats. Pertinent negatives include no chest pain, ear congestion, ear pain, fever, headaches, heartburn, hemoptysis, nasal congestion, rash, shortness of breath, weight loss or wheezing. The symptoms are aggravated by lying down and pollens.   URI   Associated symptoms include congestion, coughing, rhinorrhea and a sore throat. Pertinent negatives include no abdominal pain, chest pain, diarrhea, dysuria, ear pain, headaches, joint pain, joint swelling, nausea, neck pain, plugged ear sensation, rash, sinus pain, sneezing, swollen glands, vomiting or wheezing. She has tried antihistamine for the symptoms. The treatment provided moderate relief.       Objective   Vital Signs:   /82   Pulse 67   Temp 96.4 °F (35.8 °C) (Temporal)   Resp 16   Ht 163.8 cm (64.5\")   Wt 59.6 kg (131 lb 8 oz)   SpO2 98%   BMI 22.22 kg/m²     Physical Exam  Vitals reviewed.   Constitutional:       Appearance: Normal appearance.   HENT:      Head: Normocephalic and atraumatic.      Right Ear: Tympanic membrane and ear canal normal.      Left Ear: " Tympanic membrane and ear canal normal.      Nose: Congestion present.      Right Sinus: No maxillary sinus tenderness or frontal sinus tenderness.      Left Sinus: No maxillary sinus tenderness or frontal sinus tenderness.      Mouth/Throat:      Mouth: Mucous membranes are moist.      Pharynx: Posterior oropharyngeal erythema present.   Eyes:      Conjunctiva/sclera: Conjunctivae normal.      Pupils: Pupils are equal, round, and reactive to light.   Cardiovascular:      Rate and Rhythm: Normal rate and regular rhythm.      Heart sounds: No murmur heard.   No friction rub. No gallop.    Pulmonary:      Effort: Pulmonary effort is normal. No respiratory distress.      Breath sounds: Normal breath sounds. No wheezing, rhonchi or rales.   Skin:     General: Skin is warm and dry.   Neurological:      Mental Status: She is alert and oriented to person, place, and time.   Psychiatric:         Mood and Affect: Mood normal.        Result Review :                 Assessment and Plan    Diagnoses and all orders for this visit:    1. Acute URI (Primary)    Other orders  -     amLODIPine (NORVASC) 5 MG tablet; Take 1 tablet by mouth Daily.  Dispense: 90 tablet; Refill: 1  -     levothyroxine (SYNTHROID, LEVOTHROID) 75 MCG tablet; Take 1 tablet by mouth Daily.  Dispense: 90 tablet; Refill: 1  -     benzonatate (Tessalon Perles) 100 MG capsule; Take 2 capsules by mouth 3 (Three) Times a Day As Needed for Cough.  Dispense: 60 capsule; Refill: 0  -     doxycycline (MONODOX) 100 MG capsule; Take 1 capsule by mouth Every 12 (Twelve) Hours.  Dispense: 20 capsule; Refill: 0        Follow Up   No follow-ups on file.  Patient was given instructions and counseling regarding her condition or for health maintenance advice. Please see specific information pulled into the AVS if appropriate.     Lungs are clear  No evidence of sinusitis or bronchitis  She is concerned as she is going out of town.She does report her symptoms are improving.    Will continue current otc treatment, recommend tessalon perles prn cough, wait and see antibiotic given in event her symptoms worsen, instructions for use has been given. She will follow up for no improvement.    Refills given on thyroid and bp medication  Recent cholesterol level did show improvement, she is following up with repeat labs in September.  Answers for HPI/ROS submitted by the patient on 7/6/2021  What is the primary reason for your visit?: Cough

## 2021-10-06 ENCOUNTER — OFFICE VISIT (OUTPATIENT)
Dept: OBSTETRICS AND GYNECOLOGY | Facility: CLINIC | Age: 61
End: 2021-10-06

## 2021-10-06 ENCOUNTER — TELEPHONE (OUTPATIENT)
Dept: OBSTETRICS AND GYNECOLOGY | Facility: CLINIC | Age: 61
End: 2021-10-06

## 2021-10-06 VITALS
WEIGHT: 131.2 LBS | BODY MASS INDEX: 22.17 KG/M2 | HEART RATE: 76 BPM | SYSTOLIC BLOOD PRESSURE: 133 MMHG | DIASTOLIC BLOOD PRESSURE: 79 MMHG

## 2021-10-06 DIAGNOSIS — R22.31 MASS OF RIGHT AXILLA: Primary | ICD-10-CM

## 2021-10-06 PROCEDURE — 99213 OFFICE O/P EST LOW 20 MIN: CPT | Performed by: OBSTETRICS & GYNECOLOGY

## 2021-10-06 NOTE — PROGRESS NOTES
Chief Complaint  Breast Problem- Pt states she has a knot under armpit on right side.  Pt noticed knot a few months ago     Subjective          Kasia Carroll presents to [facility]  History of Present Illness  Patient is a 61-year-old female that noticed a mass in her right axillary region a couple of months ago.  She states she had her last Covid vaccine in March.  She denies any other breast masses and denies any nipple discharge.  She did have a mammogram in October of last year, but I am unable to see the results.    Objective   Vital Signs:   /79   Pulse 76   Wt 59.5 kg (131 lb 3.2 oz)   BMI 22.17 kg/m²     Physical Exam  Vitals reviewed. Exam conducted with a chaperone present.   Constitutional:       Appearance: Normal appearance.   Chest:      Breasts:         Right: Mass present. No swelling, bleeding, inverted nipple, nipple discharge, skin change or tenderness.         Left: No swelling, bleeding, inverted nipple, mass, nipple discharge, skin change or tenderness.       Neurological:      General: No focal deficit present.      Mental Status: She is alert. Mental status is at baseline.   Psychiatric:         Mood and Affect: Mood normal.         Behavior: Behavior normal.                 Assessment and Plan    Diagnoses and all orders for this visit:    1. Mass of right axilla (Primary)  -     Mammo Diagnostic Bilateral With CAD; Future  -     US breast right complete; Future    Patient is due for screening mammogram and diagnostic mammogram was ordered along with right breast ultrasound.  She will follow-up for annual exam or sooner if needed.    Follow Up   No follow-ups on file.  Patient was given instructions and counseling regarding her condition or for health maintenance advice. Please see specific information pulled into the AVS if appropriate.

## 2021-10-12 NOTE — TELEPHONE ENCOUNTER
Hub staff attempted to follow warm transfer process and was unsuccessful     Caller: Kasia Carroll    Relationship to patient: Self    Best call back number: [phone] (H)    Patient is needing: PATIENT RETURNED HONORIO'S CALL, PLEASE CALL PATIENT BACK ASAP

## 2021-10-14 RX ORDER — AMLODIPINE BESYLATE 5 MG/1
5 TABLET ORAL DAILY
Qty: 90 TABLET | Refills: 1 | OUTPATIENT
Start: 2021-10-14

## 2021-10-14 RX ORDER — LEVOTHYROXINE SODIUM 0.07 MG/1
75 TABLET ORAL DAILY
Qty: 90 TABLET | Refills: 1 | OUTPATIENT
Start: 2021-10-14

## 2021-11-02 ENCOUNTER — APPOINTMENT (OUTPATIENT)
Dept: WOMENS IMAGING | Facility: HOSPITAL | Age: 61
End: 2021-11-02

## 2021-11-02 PROCEDURE — 76641 ULTRASOUND BREAST COMPLETE: CPT | Performed by: RADIOLOGY

## 2021-11-02 PROCEDURE — G0279 TOMOSYNTHESIS, MAMMO: HCPCS | Performed by: RADIOLOGY

## 2021-11-02 PROCEDURE — 77066 DX MAMMO INCL CAD BI: CPT | Performed by: RADIOLOGY

## 2021-11-02 PROCEDURE — 77062 BREAST TOMOSYNTHESIS BI: CPT | Performed by: RADIOLOGY

## 2021-11-09 ENCOUNTER — OFFICE VISIT (OUTPATIENT)
Dept: OBSTETRICS AND GYNECOLOGY | Facility: CLINIC | Age: 61
End: 2021-11-09

## 2021-11-09 VITALS
DIASTOLIC BLOOD PRESSURE: 77 MMHG | SYSTOLIC BLOOD PRESSURE: 127 MMHG | HEIGHT: 65 IN | BODY MASS INDEX: 21.83 KG/M2 | WEIGHT: 131 LBS

## 2021-11-09 DIAGNOSIS — R22.31 MASS OF RIGHT AXILLA: ICD-10-CM

## 2021-11-09 DIAGNOSIS — N89.8 VAGINAL DRYNESS: ICD-10-CM

## 2021-11-09 DIAGNOSIS — Z01.419 WOMEN'S ANNUAL ROUTINE GYNECOLOGICAL EXAMINATION: Primary | ICD-10-CM

## 2021-11-09 DIAGNOSIS — Z78.0 POSTMENOPAUSE: ICD-10-CM

## 2021-11-09 DIAGNOSIS — T85.43XA LEAKAGE OF BREAST IMPLANT, INITIAL ENCOUNTER: ICD-10-CM

## 2021-11-09 DIAGNOSIS — Z12.11 SCREENING FOR COLON CANCER: ICD-10-CM

## 2021-11-09 PROCEDURE — 99396 PREV VISIT EST AGE 40-64: CPT | Performed by: NURSE PRACTITIONER

## 2021-11-09 PROCEDURE — 99213 OFFICE O/P EST LOW 20 MIN: CPT | Performed by: NURSE PRACTITIONER

## 2021-11-09 NOTE — PROGRESS NOTES
GYN Annual Exam     Chief Complaint   Patient presents with   • Gynecologic Exam     Annual exam - last pap 2020 negative, dexa 2020, MG 2021.       Kasia Carroll is a 61 y.o. female who presents for annual well woman exam. She is postmenopausal. She denies vaginal spotting or discharge. She completes SBE monthly. Recently had diagnostic mammogram completed due to axillary pain. She has not yet received imaging results.  She see Dr Mercer for plastics    Using estrace vaginal cream for vaginal dryness with good relief of symptoms. Does not need refills at this time.     This is my first time meeting Kasia Carroll  She is a patient of Dr. Danielle.    OB History        2    Para   2    Term   2            AB        Living   2       SAB        IAB        Ectopic        Molar        Multiple        Live Births   2                Mammogram: 2021  Dexa scan: 2020, osteopenia   Colonoscopy: due this year  Last Pap : 2020 negative  History of abnormal Pap smear: no  Family history of uterine, colon or ovarian cancer: no  Family history of breast cancer: no  History of abnormal mammogram: yes - breast implant leaking    Menopause:  Bleeding since? no  Vasomotor symptoms: no  HRT: yes  Dyspareunia: no      Past Medical History:   Diagnosis Date   • Disease of thyroid gland    • History of postmenopausal HRT [id number]   • Hypertension    • Silicone leakage from breast implant    • Urinary tract infection        Past Surgical History:   Procedure Laterality Date   • BREAST AUGMENTATION  1989, redone due to leakage.   • BREAST CAPSULOTOMY, IMPLANT REVISION     • BREAST EXCISIONAL BIOPSY Right     Dr. Bentley Patino, benign   • COSMETIC SURGERY      eyelids lifted/forehead   • TOE AMPUTATION Left          Current Outpatient Medications:   •  amLODIPine (NORVASC) 5 MG tablet, Take 1 tablet by mouth Daily., Disp: 90 tablet, Rfl: 1  •  Ascorbic Acid (ANDREW-C PO), Take   "by mouth., Disp: , Rfl:   •  CBD (cannabidiol) oral oil, Take  by mouth., Disp: , Rfl:   •  estradiol (ESTRACE) 0.1 MG/GM vaginal cream, Insert 1 g into the vagina 2 (Two) Times a Week., Disp: 42.5 g, Rfl: 2  •  levothyroxine (SYNTHROID, LEVOTHROID) 75 MCG tablet, Take 1 tablet by mouth Daily., Disp: 90 tablet, Rfl: 1  •  Multiple Vitamins-Minerals (MULTIVITAMIN WITH MINERALS) tablet tablet, Take 1 tablet by mouth Daily., Disp: , Rfl:   •  Probiotic Product (PROBIOTIC DAILY PO), Take  by mouth., Disp: , Rfl:     No Known Allergies    Social History     Tobacco Use   • Smoking status: Former Smoker     Packs/day: 0.50     Types: Cigarettes     Quit date: 2013     Years since quittin.8   • Smokeless tobacco: Never Used   Substance Use Topics   • Alcohol use: Yes     Comment: BEER WEEKLY    • Drug use: No       Family History   Problem Relation Age of Onset   • Hypertension Mother    • Alzheimer's disease Mother    • Prostate cancer Father        Review of Systems   Constitutional: Negative for chills, fatigue and fever.   Gastrointestinal: Negative for abdominal distention, abdominal pain, nausea and vomiting.   Endocrine: Negative for cold intolerance and heat intolerance.   Genitourinary: Negative for dyspareunia, dysuria, menstrual problem, pelvic pain, vaginal bleeding, vaginal discharge and vaginal pain.   Musculoskeletal: Negative for gait problem.   Skin: Negative for rash.   Neurological: Negative for dizziness and headaches.   Hematological: Does not bruise/bleed easily.   Psychiatric/Behavioral: Negative for behavioral problems.       /77   Ht 163.8 cm (64.5\")   Wt 59.4 kg (131 lb)   BMI 22.14 kg/m²     Physical Exam  Constitutional:       Appearance: Normal appearance.   Genitourinary:      Vulva, bladder and urethral meatus normal.      No lesions in the vagina.      Right Labia: No rash, tenderness, lesions, skin changes or Bartholin's cyst.     Left Labia: No tenderness, lesions, skin " changes, Bartholin's cyst or rash.     No labial fusion noted.      No inguinal adenopathy present in the right or left side.     No vaginal discharge, erythema, tenderness, bleeding or ulceration.      No vaginal prolapse present.     No vaginal atrophy present.       Right Adnexa: not tender, not full, not palpable, no mass present and not absent.     Left Adnexa: not tender, not full, not palpable, no mass present and not absent.     No cervical motion tenderness, discharge, friability, lesion, polyp, nabothian cyst or eversion.      Uterus is not enlarged, fixed, tender, irregular or prolapsed.      No uterine mass detected.     No urethral tenderness or mass present.      Pelvic exam was performed with patient in the lithotomy position.   Breasts: Breasts are symmetrical.      Right: Present. No swelling, bleeding, inverted nipple, mass, nipple discharge, skin change, tenderness, breast implant, axillary adenopathy or supraclavicular adenopathy.      Left: Present. No swelling, bleeding, inverted nipple, mass, nipple discharge, skin change, tenderness, breast implant, axillary adenopathy or supraclavicular adenopathy.       HENT:      Head: Normocephalic and atraumatic.   Eyes:      Pupils: Pupils are equal, round, and reactive to light.   Cardiovascular:      Rate and Rhythm: Normal rate.   Pulmonary:      Effort: Pulmonary effort is normal.   Abdominal:      General: There is no distension.      Palpations: Abdomen is soft. There is no mass.      Tenderness: There is no abdominal tenderness. There is no guarding.      Hernia: No hernia is present. There is no hernia in the left inguinal area or right inguinal area.   Musculoskeletal:         General: Normal range of motion.      Cervical back: Normal range of motion and neck supple. No tenderness.   Lymphadenopathy:      Cervical: No cervical adenopathy.      Upper Body:      Right upper body: No supraclavicular, axillary or pectoral adenopathy.      Left  upper body: No supraclavicular, axillary or pectoral adenopathy.      Lower Body: No right inguinal adenopathy. No left inguinal adenopathy.   Neurological:      General: No focal deficit present.      Mental Status: She is alert and oriented to person, place, and time.      Cranial Nerves: No cranial nerve deficit.   Skin:     General: Skin is warm and dry.   Psychiatric:         Mood and Affect: Mood normal.         Behavior: Behavior normal.         Thought Content: Thought content normal.         Judgment: Judgment normal.   Vitals and nursing note reviewed.     Assessment    Diagnoses and all orders for this visit:    1. Women's annual routine gynecological examination (Primary)    2. Postmenopause    3. Leakage of breast implant, initial encounter    4. Screening for colon cancer  -     Ambulatory Referral For Screening Colonoscopy    5. Vaginal dryness     Plan     1) Breast Health - Clinical breast exam & mammogram yearly, Self breast awareness. Schedule screening mammogram.   2) Pap - up to date, reviewed recommendations with pt  3) STD-no  4) Smoking status- nonsmoker  5) Colon health - screening colonoscopy recommended if not up to date  6) Patient's Body mass index is 22.14 kg/m². indicating that she is within normal range (BMI 18.5-24.9). No BMI management plan needed..  7) Bone health - Weight bearing exercise, dietary calcium recommendations and vitamin D  reviewed.   8) Encouraged 150 minutes of exercise per week if not medically contraindicated  9) Reviewed diagnostic mammogram and breast ultrasound results with pt. Notified silicone implant in right breast appears to be leaking. Reviewed right breast mass vs complicated cyst in need of repeat ultrasound in 6 months. She is established with plastics and will call to schedule an appointment.   10) Improvement in vaginal dryness with estrace. She will have the pharmacy send refill request when needed.     Follow up prn and one year    Poonam Darling,  APRN  11/9/2021  18:12 EST

## 2021-11-19 ENCOUNTER — TELEPHONE (OUTPATIENT)
Dept: OBSTETRICS AND GYNECOLOGY | Facility: CLINIC | Age: 61
End: 2021-11-19

## 2021-11-19 NOTE — TELEPHONE ENCOUNTER
Good afternoon,   Patient is calling to see if provider had any information about last Mx.   Patient stated that she last seen Poonam Darling and patient was told about a leakage in her breast implant.   Patient is wanting to know if provider had any information on this.      Please advise,   Thank you.

## 2021-11-21 ENCOUNTER — PREP FOR SURGERY (OUTPATIENT)
Dept: SURGERY | Facility: SURGERY CENTER | Age: 61
End: 2021-11-21

## 2021-11-21 DIAGNOSIS — Z12.11 ENCOUNTER FOR SCREENING COLONOSCOPY: Primary | ICD-10-CM

## 2021-11-22 PROBLEM — Z12.11 ENCOUNTER FOR SCREENING COLONOSCOPY: Status: ACTIVE | Noted: 2021-11-22

## 2021-11-22 RX ORDER — SODIUM CHLORIDE, SODIUM LACTATE, POTASSIUM CHLORIDE, CALCIUM CHLORIDE 600; 310; 30; 20 MG/100ML; MG/100ML; MG/100ML; MG/100ML
30 INJECTION, SOLUTION INTRAVENOUS CONTINUOUS PRN
Status: CANCELLED | OUTPATIENT
Start: 2021-11-22

## 2021-11-22 RX ORDER — SODIUM CHLORIDE 0.9 % (FLUSH) 0.9 %
10 SYRINGE (ML) INJECTION AS NEEDED
Status: CANCELLED | OUTPATIENT
Start: 2021-11-22

## 2021-11-22 RX ORDER — SODIUM CHLORIDE 0.9 % (FLUSH) 0.9 %
3 SYRINGE (ML) INJECTION EVERY 12 HOURS SCHEDULED
Status: CANCELLED | OUTPATIENT
Start: 2021-11-22

## 2021-11-22 NOTE — TELEPHONE ENCOUNTER
Deedee, was she wanting me to call her to review results or was she wanting to speak with Dr العراقي? I reviewed results with her at her last visit and recommend she follow up with her plastic surgeon.

## 2021-11-22 NOTE — TELEPHONE ENCOUNTER
Left VM for patient to call office back regarding breast implant leakage.   Patient needs to follow up with plastic surgeon on that matter per Nurse Practitioner Phong.

## 2021-12-07 ENCOUNTER — OFFICE VISIT (OUTPATIENT)
Dept: FAMILY MEDICINE CLINIC | Facility: CLINIC | Age: 61
End: 2021-12-07

## 2021-12-07 VITALS
BODY MASS INDEX: 22.13 KG/M2 | OXYGEN SATURATION: 98 % | HEIGHT: 65 IN | TEMPERATURE: 95.7 F | RESPIRATION RATE: 16 BRPM | WEIGHT: 132.8 LBS | DIASTOLIC BLOOD PRESSURE: 94 MMHG | HEART RATE: 78 BPM | SYSTOLIC BLOOD PRESSURE: 152 MMHG

## 2021-12-07 DIAGNOSIS — Z00.00 ANNUAL PHYSICAL EXAM: Primary | ICD-10-CM

## 2021-12-07 DIAGNOSIS — E03.9 ACQUIRED HYPOTHYROIDISM: ICD-10-CM

## 2021-12-07 DIAGNOSIS — E78.2 MIXED HYPERLIPIDEMIA: ICD-10-CM

## 2021-12-07 DIAGNOSIS — Z79.899 MEDICATION MANAGEMENT: ICD-10-CM

## 2021-12-07 DIAGNOSIS — R07.89 CHEST HEAVINESS: ICD-10-CM

## 2021-12-07 DIAGNOSIS — I10 ESSENTIAL HYPERTENSION: ICD-10-CM

## 2021-12-07 PROCEDURE — 99396 PREV VISIT EST AGE 40-64: CPT | Performed by: NURSE PRACTITIONER

## 2021-12-07 PROCEDURE — 93000 ELECTROCARDIOGRAM COMPLETE: CPT | Performed by: NURSE PRACTITIONER

## 2021-12-07 RX ORDER — AMLODIPINE BESYLATE 5 MG/1
5 TABLET ORAL DAILY
Qty: 90 TABLET | Refills: 1 | Status: SHIPPED | OUTPATIENT
Start: 2021-12-07 | End: 2022-07-12

## 2021-12-07 RX ORDER — LEVOTHYROXINE SODIUM 0.07 MG/1
75 TABLET ORAL DAILY
Qty: 90 TABLET | Refills: 1 | Status: SHIPPED | OUTPATIENT
Start: 2021-12-07 | End: 2022-07-12

## 2021-12-07 NOTE — PROGRESS NOTES
"Chief Complaint  Annual Exam    Subjective          Kasia Carroll presents to [facility]  [place] presents for an annual physical. Since her last visit, she reports two new grandbabies, twins, that are now 5 months old. She also has a 2 year old grandson that she helps out with frequently. She is helping her daughter plan a wedding for April. She reports increased stress and epigastric/chest heaviness. Denies chest pain, feels like it is more in abdomen.     She has a history of hypertension, on amlodipine. Her bp is elevated in the office today, however at last several visits is in normal range.     She is taking levothyroxine for hypothyroid and tolerating well.     She is up to date on all health maintenance items. She has received her covid vaccines with her booster, recently received her flu shot and pap/mammo utd. She is scheduled for a colonoscopy in March.       Objective   Vital Signs:   /94   Pulse 78   Temp 95.7 °F (35.4 °C) (Infrared)   Resp 16   Ht 163.8 cm (64.5\")   Wt 60.2 kg (132 lb 12.8 oz)   SpO2 98%   BMI 22.44 kg/m²     Physical Exam  Vitals reviewed.   Constitutional:       General: She is not in acute distress.     Appearance: She is well-developed and normal weight. She is not diaphoretic.   HENT:      Head: Normocephalic and atraumatic.      Right Ear: Tympanic membrane, ear canal and external ear normal.      Left Ear: Tympanic membrane, ear canal and external ear normal.      Nose: Nose normal.      Mouth/Throat:      Mouth: Mucous membranes are moist.      Pharynx: Oropharynx is clear. Uvula midline. No oropharyngeal exudate.   Eyes:      Conjunctiva/sclera: Conjunctivae normal.      Pupils: Pupils are equal, round, and reactive to light.   Cardiovascular:      Rate and Rhythm: Normal rate and regular rhythm.      Heart sounds: Normal heart sounds. No murmur heard.  No friction rub. No gallop.    Pulmonary:      Effort: Pulmonary effort is normal. " No respiratory distress.      Breath sounds: Normal breath sounds. No wheezing or rales.   Abdominal:      General: Bowel sounds are normal. There is no distension.      Palpations: Abdomen is soft.      Tenderness: There is no abdominal tenderness.   Musculoskeletal:      Cervical back: Neck supple.   Skin:     General: Skin is warm and dry.   Neurological:      Mental Status: She is alert and oriented to person, place, and time.   Psychiatric:         Mood and Affect: Mood normal.        Result Review :            ECG 12 Lead    Date/Time: 12/7/2021 3:31 PM  Performed by: Bianca Cullen APRN  Authorized by: Bianca Cullen APRN   Previous ECG: no previous ECG available  Rhythm: sinus rhythm  BPM: 66  Conduction: conduction normal  ST Segments: ST segments normal  QRS axis: normal    Clinical impression: normal ECG              Assessment and Plan    Diagnoses and all orders for this visit:    1. Annual physical exam (Primary)  -     CBC & Differential  -     Comprehensive Metabolic Panel  -     Lipid Panel With LDL / HDL Ratio  -     TSH Rfx On Abnormal To Free T4  -     ECG 12 Lead    2. Mixed hyperlipidemia    3. Medication management    4. Acquired hypothyroidism  -     TSH Rfx On Abnormal To Free T4    5. Essential hypertension  -     CBC & Differential  -     Comprehensive Metabolic Panel    6. Chest heaviness  -     ECG 12 Lead    Other orders  -     amLODIPine (NORVASC) 5 MG tablet; Take 1 tablet by mouth Daily.  Dispense: 90 tablet; Refill: 1  -     levothyroxine (SYNTHROID, LEVOTHROID) 75 MCG tablet; Take 1 tablet by mouth Daily.  Dispense: 90 tablet; Refill: 1        Follow Up   Return in about 1 year (around 12/7/2022).  Patient was given instructions and counseling regarding her condition or for health maintenance advice. Please see specific information pulled into the AVS if appropriate.     ekg is in normal range, see scanned documents  Do suspect chest/abdominal heaviness is  more related to reflux and stress. Recommend otc nexium or prilosec to use for the next month  She agrees with recommendation.    She does see a  twice weekly and eats a well balanced diet    BP elevated, but in normal range previously, recommend to monitor at this time, would not adjust medication based on one reading  Discussed cholesterol, she will try OTC red yeast rice.

## 2021-12-08 LAB
ALBUMIN SERPL-MCNC: 5 G/DL (ref 3.8–4.8)
ALBUMIN/GLOB SERPL: 2.5 {RATIO} (ref 1.2–2.2)
ALP SERPL-CCNC: 46 IU/L (ref 44–121)
ALT SERPL-CCNC: 28 IU/L (ref 0–32)
AST SERPL-CCNC: 34 IU/L (ref 0–40)
BASOPHILS # BLD AUTO: 0 X10E3/UL (ref 0–0.2)
BASOPHILS NFR BLD AUTO: 0 %
BILIRUB SERPL-MCNC: 0.8 MG/DL (ref 0–1.2)
BUN SERPL-MCNC: 5 MG/DL (ref 8–27)
BUN/CREAT SERPL: 10 (ref 12–28)
CALCIUM SERPL-MCNC: 9.9 MG/DL (ref 8.7–10.3)
CHLORIDE SERPL-SCNC: 94 MMOL/L (ref 96–106)
CHOLEST SERPL-MCNC: 254 MG/DL (ref 100–199)
CO2 SERPL-SCNC: 23 MMOL/L (ref 20–29)
CREAT SERPL-MCNC: 0.5 MG/DL (ref 0.57–1)
EOSINOPHIL # BLD AUTO: 0.1 X10E3/UL (ref 0–0.4)
EOSINOPHIL NFR BLD AUTO: 2 %
ERYTHROCYTE [DISTWIDTH] IN BLOOD BY AUTOMATED COUNT: 11.8 % (ref 11.7–15.4)
GLOBULIN SER CALC-MCNC: 2 G/DL (ref 1.5–4.5)
GLUCOSE SERPL-MCNC: 102 MG/DL (ref 65–99)
HCT VFR BLD AUTO: 39.9 % (ref 34–46.6)
HDLC SERPL-MCNC: 102 MG/DL
HGB BLD-MCNC: 14 G/DL (ref 11.1–15.9)
IMM GRANULOCYTES # BLD AUTO: 0 X10E3/UL (ref 0–0.1)
IMM GRANULOCYTES NFR BLD AUTO: 0 %
LDLC SERPL CALC-MCNC: 145 MG/DL (ref 0–99)
LDLC/HDLC SERPL: 1.4 RATIO (ref 0–3.2)
LYMPHOCYTES # BLD AUTO: 1.1 X10E3/UL (ref 0.7–3.1)
LYMPHOCYTES NFR BLD AUTO: 21 %
MCH RBC QN AUTO: 34.1 PG (ref 26.6–33)
MCHC RBC AUTO-ENTMCNC: 35.1 G/DL (ref 31.5–35.7)
MCV RBC AUTO: 97 FL (ref 79–97)
MONOCYTES # BLD AUTO: 0.5 X10E3/UL (ref 0.1–0.9)
MONOCYTES NFR BLD AUTO: 11 %
NEUTROPHILS # BLD AUTO: 3.3 X10E3/UL (ref 1.4–7)
NEUTROPHILS NFR BLD AUTO: 66 %
PLATELET # BLD AUTO: 249 X10E3/UL (ref 150–450)
POTASSIUM SERPL-SCNC: 3.9 MMOL/L (ref 3.5–5.2)
PROT SERPL-MCNC: 7 G/DL (ref 6–8.5)
RBC # BLD AUTO: 4.11 X10E6/UL (ref 3.77–5.28)
SODIUM SERPL-SCNC: 134 MMOL/L (ref 134–144)
TRIGL SERPL-MCNC: 48 MG/DL (ref 0–149)
TSH SERPL DL<=0.005 MIU/L-ACNC: 0.91 UIU/ML (ref 0.45–4.5)
VLDLC SERPL CALC-MCNC: 7 MG/DL (ref 5–40)
WBC # BLD AUTO: 5 X10E3/UL (ref 3.4–10.8)

## 2022-03-07 NOTE — SIGNIFICANT NOTE
Education provided the Patient on the following:    - Nothing to Eat or Drink after MN the night before the procedure    - Avoid red/purple fluids while completing their bowel prep as ordered by physician  -Contact Gastrointerologist office for any questions about specific details regarding colon prep    -You will need to have someone drive you home after your colonoscopy and remain with you for 24 hours after the procedure  - The date of your Surgery, you may have one visitor at bedside or within 10-15 minutes of [facility]  -Please wear warm socks when you arrive for your colonoscopy  -Remove all jewelry and leave any valuables before arriving the day of your procedure (all will have to be removed before leaving preop)  -You will need to arrive at 1030 on 3/8/22 for your colonoscopy    -Feel free to contact us at: [phone] with any additional questions/concerns

## 2022-03-08 ENCOUNTER — ANESTHESIA (OUTPATIENT)
Dept: SURGERY | Facility: SURGERY CENTER | Age: 62
End: 2022-03-08

## 2022-03-08 ENCOUNTER — HOSPITAL ENCOUNTER (OUTPATIENT)
Facility: SURGERY CENTER | Age: 62
Setting detail: HOSPITAL OUTPATIENT SURGERY
Discharge: HOME | End: 2022-03-08
Attending: INTERNAL MEDICINE | Admitting: INTERNAL MEDICINE

## 2022-03-08 ENCOUNTER — ANESTHESIA EVENT (OUTPATIENT)
Dept: SURGERY | Facility: SURGERY CENTER | Age: 62
End: 2022-03-08

## 2022-03-08 VITALS
OXYGEN SATURATION: 98 % | RESPIRATION RATE: 18 BRPM | DIASTOLIC BLOOD PRESSURE: 83 MMHG | SYSTOLIC BLOOD PRESSURE: 117 MMHG | HEIGHT: 65 IN | TEMPERATURE: 97.1 F | WEIGHT: 128 LBS | HEART RATE: 65 BPM | BODY MASS INDEX: 21.33 KG/M2

## 2022-03-08 DIAGNOSIS — Z12.11 ENCOUNTER FOR SCREENING COLONOSCOPY: ICD-10-CM

## 2022-03-08 PROCEDURE — 45385 COLONOSCOPY W/LESION REMOVAL: CPT | Performed by: INTERNAL MEDICINE

## 2022-03-08 PROCEDURE — 88305 TISSUE EXAM BY PATHOLOGIST: CPT | Performed by: INTERNAL MEDICINE

## 2022-03-08 PROCEDURE — 25010000002 PROPOFOL 10 MG/ML EMULSION: Performed by: ANESTHESIOLOGY

## 2022-03-08 RX ORDER — SODIUM CHLORIDE 0.9 % (FLUSH) 0.9 %
10 SYRINGE (ML) INJECTION AS NEEDED
Status: DISCONTINUED | OUTPATIENT
Start: 2022-03-08 | End: 2022-03-08 | Stop reason: HOSPADM

## 2022-03-08 RX ORDER — SODIUM CHLORIDE 0.9 % (FLUSH) 0.9 %
3 SYRINGE (ML) INJECTION EVERY 12 HOURS SCHEDULED
Status: DISCONTINUED | OUTPATIENT
Start: 2022-03-08 | End: 2022-03-08 | Stop reason: HOSPADM

## 2022-03-08 RX ORDER — MAGNESIUM HYDROXIDE 1200 MG/15ML
LIQUID ORAL AS NEEDED
Status: DISCONTINUED | OUTPATIENT
Start: 2022-03-08 | End: 2022-03-08 | Stop reason: HOSPADM

## 2022-03-08 RX ORDER — SODIUM CHLORIDE, SODIUM LACTATE, POTASSIUM CHLORIDE, CALCIUM CHLORIDE 600; 310; 30; 20 MG/100ML; MG/100ML; MG/100ML; MG/100ML
INJECTION, SOLUTION INTRAVENOUS CONTINUOUS PRN
Status: DISCONTINUED | OUTPATIENT
Start: 2022-03-08 | End: 2022-03-08 | Stop reason: SURG

## 2022-03-08 RX ORDER — SODIUM CHLORIDE, SODIUM LACTATE, POTASSIUM CHLORIDE, CALCIUM CHLORIDE 600; 310; 30; 20 MG/100ML; MG/100ML; MG/100ML; MG/100ML
30 INJECTION, SOLUTION INTRAVENOUS CONTINUOUS PRN
Status: DISCONTINUED | OUTPATIENT
Start: 2022-03-08 | End: 2022-03-08 | Stop reason: HOSPADM

## 2022-03-08 RX ORDER — PROPOFOL 10 MG/ML
VIAL (ML) INTRAVENOUS AS NEEDED
Status: DISCONTINUED | OUTPATIENT
Start: 2022-03-08 | End: 2022-03-08 | Stop reason: SURG

## 2022-03-08 RX ORDER — LIDOCAINE HYDROCHLORIDE 20 MG/ML
INJECTION, SOLUTION INFILTRATION; PERINEURAL AS NEEDED
Status: DISCONTINUED | OUTPATIENT
Start: 2022-03-08 | End: 2022-03-08 | Stop reason: SURG

## 2022-03-08 RX ADMIN — PROPOFOL 120 MG: 10 INJECTION, EMULSION INTRAVENOUS at 12:09

## 2022-03-08 RX ADMIN — LIDOCAINE HYDROCHLORIDE 60 MG: 20 INJECTION, SOLUTION INFILTRATION; PERINEURAL at 12:09

## 2022-03-08 RX ADMIN — SODIUM CHLORIDE, SODIUM LACTATE, POTASSIUM CHLORIDE, AND CALCIUM CHLORIDE: .6; .31; .03; .02 INJECTION, SOLUTION INTRAVENOUS at 12:04

## 2022-03-08 RX ADMIN — PROPOFOL 160 MCG/KG/MIN: 10 INJECTION, EMULSION INTRAVENOUS at 12:09

## 2022-03-08 RX ADMIN — SODIUM CHLORIDE, POTASSIUM CHLORIDE, SODIUM LACTATE AND CALCIUM CHLORIDE 30 ML/HR: 600; 310; 30; 20 INJECTION, SOLUTION INTRAVENOUS at 10:52

## 2022-03-08 NOTE — H&P
No chief complaint on file.      HPI  Patient today for screening colonoscopy.         Problem List:    Patient Active Problem List   Diagnosis   • Abnormal CAT scan   • Breast calcification seen on mammogram   • Esophagitis, reflux   • Essential hypertension   • Hypothyroidism (acquired)   • Mixed hyperlipidemia   • Floating ossicle of ankle   • Acute pain of both knees   • Numbness and tingling of both feet   • Sprain of right ankle   • Osteopenia   • Encounter for screening colonoscopy       Medical History:    Past Medical History:   Diagnosis Date   • Disease of thyroid gland    • History of postmenopausal HRT [id number]   • Hypertension    • Silicone leakage from breast implant    • Urinary tract infection         Social History:    Social History     Socioeconomic History   • Marital status:      Spouse name: MILLIE PRECIADO   • Number of children: 2   Tobacco Use   • Smoking status: Former Smoker     Packs/day: 0.50     Types: Cigarettes     Quit date: 2013     Years since quittin.1   • Smokeless tobacco: Never Used   Vaping Use   • Vaping Use: Never used   Substance and Sexual Activity   • Alcohol use: Yes     Comment: BEER WEEKLY    • Drug use: No   • Sexual activity: Yes     Partners: Male     Birth control/protection: Post-menopausal       Family History:   Family History   Problem Relation Age of Onset   • Hypertension Mother    • Alzheimer's disease Mother    • Prostate cancer Father        Surgical History:   Past Surgical History:   Procedure Laterality Date   • BREAST AUGMENTATION  1989, redone due to leakage.   • BREAST CAPSULOTOMY, IMPLANT REVISION     • BREAST EXCISIONAL BIOPSY Right     Dr. Bentley Patino, benign   • COLONOSCOPY     • COSMETIC SURGERY      eyelids lifted/forehead   • TOE AMPUTATION Left          Current Facility-Administered Medications:   •  lactated ringers infusion, 30 mL/hr, Intravenous, Continuous PRN, Gorge Rogers MD,  Last Rate: 30 mL/hr at 03/08/22 1052, 30 mL/hr at 03/08/22 1052  •  sodium chloride 0.9 % flush 10 mL, 10 mL, Intravenous, PRN, Gorge Rogers MD  •  sodium chloride 0.9 % flush 3 mL, 3 mL, Intravenous, Q12H, Gorge Rogers MD    Allergies: No Known Allergies     The following portions of the patient's history were reviewed by me and updated as appropriate: review of systems, allergies, current medications, past family history, past medical history, past social history, past surgical history and problem list.    Vitals:    03/08/22 1047   BP: 156/73   Pulse: 80   Resp: 18   Temp: 97.3 °F (36.3 °C)   SpO2: 96%       PHYSICAL EXAM:    CONSTITUTIONAL:  today's vital signs reviewed by me  GASTROINTESTINAL: abdomen is soft nontender nondistended with normal active bowel sounds, no masses are appreciated    Assessment/ Plan  We will proceed today with screening colonoscopy.    Risks and benefits as well as alternatives to endoscopic evaluation were explained to the patient and they voiced understanding and wish to proceed.  These risks include but are not limited to the risk of bleeding, perforation, adverse reaction to sedation, and missed lesions.  The patient was given the opportunity to ask questions prior to the endoscopic procedure.

## 2022-03-08 NOTE — EXTERNAL PATIENT INSTRUCTIONS
Patient Education   Table of Contents       Diverticulosis     To view videos and all your education online visit,   [email or web address]   or scan this QR code with your smartphone.                  Diverticulosis        Diverticulosis is a condition that develops when small pouches (diverticula) form in the wall of the large intestine (colon). The colon is where water is absorbed and stool (feces) is formed. The pouches form when the inside layer of the colon pushes through weak spots in the outer layers of the colon. You may have a few pouches or many of them.   The pouches usually do not cause problems unless they become inflamed or infected. When this happens, the condition is called diverticulitis.     What are the causes?   The cause of this condition is not known.   What increases the risk?    The following factors may make you more likely to develop this condition:       Being older than age 60. Your risk for this condition increases with age. Diverticulosis is rare among people younger than age 30. By age 80, many people have it.       Eating a low-fiber diet.       Having frequent constipation.       Being overweight.       Not getting enough exercise.       Smoking.       Taking over-the-counter pain medicines, like aspirin and ibuprofen.       Having a family history of diverticulosis.     What are the signs or symptoms?    In most people, there are no symptoms of this condition. If you do have symptoms, they may include:       Bloating.       Cramps in the abdomen.       Constipation or diarrhea.       Pain in the lower left side of the abdomen.     How is this diagnosed?    Because diverticulosis usually has no symptoms, it is most often diagnosed during an exam for other colon problems. The condition may be diagnosed by:       Using a flexible scope to examine the colon (colonoscopy).       Taking an X-ray of the colon after dye has been put into the colon (barium enema).       Having a CT  scan.     How is this treated?       You may not need treatment for this condition. Your health care provider may recommend treatment to prevent problems. You may need treatment if you have symptoms or if you previously had diverticulitis. Treatment may include:       Eating a high-fiber diet.       Taking a fiber supplement.       Taking a live bacteria supplement (probiotic).       Taking medicine to relax your colon.       Follow these instructions at home:   Medicines         Take over-the-counter and prescription medicines only as told by your health care provider.       If told by your health care provider, take a fiber supplement or probiotic.     Constipation prevention       Your condition may cause constipation. To prevent or treat constipation, you may need to:       Drink enough fluid to keep your urine pale yellow.       Take over-the-counter or prescription medicines.       Eat foods that are high in fiber, such as beans, whole grains, and fresh fruits and vegetables.       Limit foods that are high in fat and processed sugars, such as fried or sweet foods.     General instructions         Try not to strain when you have a bowel movement.       Keep all follow-up visits as told by your health care provider. This is important.       Contact a health care provider if you:         Have pain in your abdomen.       Have bloating.       Have cramps.       Have not had a bowel movement in 3 days.     Get help right away if:         Your pain gets worse.       Your bloating becomes very bad.       You have a fever or chills, and your symptoms suddenly get worse.       You vomit.       You have bowel movements that are bloody or black.       You have bleeding from your rectum.     Summary         Diverticulosis is a condition that develops when small pouches (diverticula) form in the wall of the large intestine (colon).       You may have a few pouches or many of them.       This condition is most often  diagnosed during an exam for other colon problems.       Treatment may include increasing the fiber in your diet, taking supplements, or taking medicines.     This information is not intended to replace advice given to you by your health care provider. Make sure you discuss any questions you have with your health care provider.     Document Released: 09/14/2005Document Revised: 07/16/2020Document Reviewed: 07/16/2020     [employer] Patient Education ? 2022 [employer] Inc.

## 2022-03-08 NOTE — ANESTHESIA PREPROCEDURE EVALUATION
Anesthesia Evaluation     NPO Solid Status: > 8 hours  NPO Liquid Status: < 2 hours           Airway   Mallampati: II  TM distance: >3 FB  Neck ROM: full  Dental          Pulmonary - normal exam   (+) a smoker Former,   Cardiovascular - normal exam  Exercise tolerance: good (4-7 METS)    (+) hypertension well controlled less than 2 medications, hyperlipidemia,       Neuro/Psych  (+) numbness,    GI/Hepatic/Renal/Endo    (+)   thyroid problem hypothyroidism    Musculoskeletal     Abdominal    Substance History      OB/GYN          Other                      Anesthesia Plan    ASA 2     MAC   (Black coffee at 10am, procedure can commence after 1200)  intravenous induction     Anesthetic plan, all risks, benefits, and alternatives have been provided, discussed and informed consent has been obtained with: patient.        CODE STATUS:

## 2022-03-08 NOTE — ANESTHESIA POSTPROCEDURE EVALUATION
"Patient: Kasia Carroll    Procedure Summary     Date: 03/08/22 Room / Location: [facility]    Anesthesia Start: 1200 Anesthesia Stop: 1235    Procedure: COLONOSCOPY WITH POLYPECTOMY (N/A ) Diagnosis:       Encounter for screening colonoscopy      (Encounter for screening colonoscopy [Z12.11])    Surgeons: Gorge Rogers MD Provider: Gauri Bassett MD    Anesthesia Type: MAC ASA Status: 2          Anesthesia Type: MAC    Vitals  Vitals Value Taken Time   /83 03/08/22 1245   Temp 36.2 °C (97.1 °F) 03/08/22 1235   Pulse 63 03/08/22 1251   Resp 17 03/08/22 1245   SpO2 98 % 03/08/22 1251   Vitals shown include unvalidated device data.        Post Anesthesia Care and Evaluation    Patient location during evaluation: bedside  Patient participation: complete - patient participated  Level of consciousness: awake  Pain management: adequate  Airway patency: patent  Anesthetic complications: No anesthetic complications    Cardiovascular status: acceptable  Respiratory status: acceptable  Hydration status: acceptable    Comments: /83   Pulse 71   Temp 36.2 °C (97.1 °F) (Temporal)   Resp 17   Ht 163.8 cm (64.5\")   Wt 58.1 kg (128 lb)   SpO2 99%   BMI 21.63 kg/m²       "

## 2022-03-08 NOTE — DISCHARGE INSTRUCTIONS
Monitored Anesthesia Care, Care After  These instructions provide you with information about caring for yourself after your procedure. Your health care provider may also give you more specific instructions. Your treatment has been planned according to current medical practices, but problems sometimes occur. Call your health care provider if you have any problems or questions after your procedure.  What can I expect after the procedure?  After your procedure, you may:  Feel sleepy for several hours.  Feel clumsy and have poor balance for several hours.  Feel forgetful about what happened after the procedure.  Have poor judgment for several hours.  Feel nauseous or vomit.  Have a sore throat if you had a breathing tube during the procedure.  Follow these instructions at home:    *****************NO DRIVING TODAY****************************    For at least 24 hours after the procedure:             Have a responsible adult stay with you. It is important to have someone help care for you until you are awake and alert.  Rest as needed.  Do not:  Participate in activities in which you could fall or become injured.  Use heavy machinery.  Drink alcohol.  Take sleeping pills or medicines that cause drowsiness.  Make important decisions or sign legal documents.  Take care of children on your own.  Eating and drinking  Follow the diet that is recommended by your health care provider.  If you vomit, drink water, juice, or soup when you can drink without vomiting.  Make sure you have little or no nausea before eating solid foods.  General instructions  Take over-the-counter and prescription medicines only as told by your health care provider.  If you have sleep apnea, surgery and certain medicines can increase your risk for breathing problems. Follow instructions from your health care provider about wearing your sleep device:  Anytime you are sleeping, including during daytime naps.  While taking prescription pain medicines,  sleeping medicines, or medicines that make you drowsy.  If you smoke, do not smoke without supervision.  Keep all follow-up visits as told by your health care provider. This is important.  Contact a health care provider if:  You keep feeling nauseous or you keep vomiting.  You feel light-headed.  You develop a rash.  You have a fever.  Get help right away if:  You have trouble breathing.  Summary  For several hours after your procedure, you may feel sleepy and have poor judgment.  Have a responsible adult stay with you for at least 24 hours or until you are awake and alert.  This information is not intended to replace advice given to you by your health care provider. Make sure you discuss any questions you have with your health care provider.  Document Released: 04/09/2017 Document Revised: 08/03/2018 Document Reviewed: 04/09/2017  [employer] Patient Education © 2019 [employer]    Colonoscopy, Adult, Care After  This sheet gives you information about how to care for yourself after your procedure. Your doctor may also give you more specific instructions. If you have problems or questions, call your doctor.  What can I expect after the procedure?  After the procedure, it is common to have:  A small amount of blood in your poop for 24 hours.  Some gas.  Mild cramping or bloating in your belly.  Follow these instructions at home:  General instructions    ***************DO NOT DRIVE TODAY*******************    For the first 24 hours after the procedure:  Do not sign important documents.  Do not drink alcohol.  Do your daily activities more slowly than normal.  Eat foods that are soft and easy to digest.  Take over-the-counter or prescription medicines only as told by your doctor.  To help cramping and bloating:       Try walking around.  Put heat on your belly (abdomen) as told by your doctor. Use a heat source that your doctor recommends, such as a moist heat pack or a heating pad.  Put a towel between your  skin and the heat source.  Leave the heat on for 20-30 minutes.  Remove the heat if your skin turns bright red. This is especially important if you cannot feel pain, heat, or cold. You can get burned.  Eating and drinking

## 2022-03-09 LAB
LAB AP CASE REPORT: NORMAL
LAB AP CLINICAL INFORMATION: NORMAL
PATH REPORT.FINAL DX SPEC: NORMAL
PATH REPORT.GROSS SPEC: NORMAL

## 2022-04-15 DIAGNOSIS — Z09 FOLLOW-UP EXAM, 3-6 MONTHS SINCE PREVIOUS EXAM: Primary | ICD-10-CM

## 2022-04-15 DIAGNOSIS — N60.01 BREAST CYST, RIGHT: ICD-10-CM

## 2022-05-02 ENCOUNTER — APPOINTMENT (OUTPATIENT)
Dept: WOMENS IMAGING | Facility: HOSPITAL | Age: 62
End: 2022-05-02

## 2022-05-02 PROCEDURE — 76641 ULTRASOUND BREAST COMPLETE: CPT | Performed by: RADIOLOGY

## 2022-05-04 DIAGNOSIS — E78.2 MIXED HYPERLIPIDEMIA: Primary | ICD-10-CM

## 2022-05-09 ENCOUNTER — LAB (OUTPATIENT)
Dept: LAB | Facility: HOSPITAL | Age: 62
End: 2022-05-09

## 2022-05-09 PROCEDURE — 80061 LIPID PANEL: CPT | Performed by: NURSE PRACTITIONER

## 2022-05-09 PROCEDURE — 80053 COMPREHEN METABOLIC PANEL: CPT | Performed by: NURSE PRACTITIONER

## 2022-05-10 DIAGNOSIS — Z09 FOLLOW-UP EXAM, 3-6 MONTHS SINCE PREVIOUS EXAM: ICD-10-CM

## 2022-05-10 DIAGNOSIS — N60.01 BREAST CYST, RIGHT: ICD-10-CM

## 2022-05-11 ENCOUNTER — OFFICE VISIT (OUTPATIENT)
Dept: FAMILY MEDICINE CLINIC | Facility: CLINIC | Age: 62
End: 2022-05-11

## 2022-05-11 VITALS
WEIGHT: 133.1 LBS | BODY MASS INDEX: 22.72 KG/M2 | HEIGHT: 64 IN | OXYGEN SATURATION: 98 % | RESPIRATION RATE: 18 BRPM | SYSTOLIC BLOOD PRESSURE: 122 MMHG | HEART RATE: 77 BPM | DIASTOLIC BLOOD PRESSURE: 78 MMHG | TEMPERATURE: 96.5 F

## 2022-05-11 DIAGNOSIS — E78.2 MIXED HYPERLIPIDEMIA: ICD-10-CM

## 2022-05-11 DIAGNOSIS — R53.83 FATIGUE, UNSPECIFIED TYPE: Primary | ICD-10-CM

## 2022-05-11 PROCEDURE — 99213 OFFICE O/P EST LOW 20 MIN: CPT | Performed by: NURSE PRACTITIONER

## 2022-05-11 NOTE — PROGRESS NOTES
"Chief Complaint  Fatigue (X2 M) and Hyperlipidemia    Subjective          Kasia Carroll presents to [facility]  Toes feel tingly, intermittent,   Wedding, wore heels for several hours   2 days weekly  Resuming walking, previous walking 5 days a week  She does have increased fatigue, sleeping well, she helps care for grandkids    Fatigue  This is a new problem. The current episode started more than 1 month ago. The problem occurs daily. Associated symptoms include fatigue. Pertinent negatives include no abdominal pain, anorexia, arthralgias, change in bowel habit, chest pain, chills, congestion, coughing, diaphoresis, fever, headaches, joint swelling, myalgias, nausea, neck pain, numbness, rash, sore throat, swollen glands, urinary symptoms, vertigo, visual change, vomiting or weakness. Nothing aggravates the symptoms. She has tried nothing for the symptoms. The treatment provided no relief.   Hyperlipidemia  This is a chronic problem. The current episode started more than 1 year ago. The problem is uncontrolled. Recent lipid tests were reviewed and are high. Exacerbating diseases include hypothyroidism. She has no history of chronic renal disease, diabetes, liver disease, obesity or nephrotic syndrome. There are no known factors aggravating her hyperlipidemia. Pertinent negatives include no chest pain, focal sensory loss, focal weakness, leg pain, myalgias or shortness of breath. Current antihyperlipidemic treatment includes herbal therapy. The current treatment provides no improvement of lipids. Compliance problems include adherence to diet.  Risk factors for coronary artery disease include dyslipidemia.       Objective   Vital Signs:  /78 (BP Location: Left arm, Patient Position: Sitting, Cuff Size: Adult)   Pulse 77   Temp 96.5 °F (35.8 °C) (Temporal)   Resp 18   Ht 163.8 cm (64.49\")   Wt 60.4 kg (133 lb 1.6 oz)   SpO2 98%   BMI 22.50 kg/m²     BMI is " within normal parameters. No follow-up required.      Physical Exam  Vitals reviewed.   Constitutional:       Appearance: Normal appearance.   Cardiovascular:      Rate and Rhythm: Normal rate and regular rhythm.      Heart sounds: No murmur heard.    No friction rub. No gallop.   Pulmonary:      Effort: Pulmonary effort is normal. No respiratory distress.      Breath sounds: Normal breath sounds. No wheezing, rhonchi or rales.   Skin:     General: Skin is warm and dry.   Neurological:      Mental Status: She is alert and oriented to person, place, and time.   Psychiatric:         Mood and Affect: Mood normal.        Result Review :                 Assessment and Plan    Diagnoses and all orders for this visit:    1. Fatigue, unspecified type (Primary)    2. Mixed hyperlipidemia             Follow Up   No follow-ups on file.  Patient was given instructions and counseling regarding her condition or for health maintenance advice. Please see specific information pulled into the AVS if appropriate.     Recommend b12 and vitamin D supplements, offered labs today, patient would like to start oral supplement to see if symptoms improve  Repeat labs reviewed with patient at time of service  Hyperlipidemia: tried red yeast rice, labs have worsened but she reports eating out more frequently for dinner  Will monitor diet, make better choices and repeat labs in future visit

## 2022-05-19 ENCOUNTER — TELEPHONE (OUTPATIENT)
Dept: OBSTETRICS AND GYNECOLOGY | Facility: CLINIC | Age: 62
End: 2022-05-19

## 2022-05-31 NOTE — PATIENT INSTRUCTIONS
This is a very nice 56-year-old with acute cystitis.  I will call out sulfamethoxazole trimethoprim and send a culture out.  
equal bilaterally

## 2022-06-13 ENCOUNTER — TELEPHONE (OUTPATIENT)
Dept: OBSTETRICS AND GYNECOLOGY | Facility: CLINIC | Age: 62
End: 2022-06-13

## 2022-06-13 NOTE — TELEPHONE ENCOUNTER
Sent My Chart message to Valencia that her report from [facility] was stable. I tried to call her on 5/19/2022 with no answer and wanted to make sure she had received her letter from [facility]. They recommend a F/U Dx bilateral Mx and Right breast US in 6 months, 11/2022. You can call [facility] at [phone]. Thank you.

## 2022-07-12 RX ORDER — LEVOTHYROXINE SODIUM 0.07 MG/1
TABLET ORAL
Qty: 90 TABLET | Refills: 1 | Status: SHIPPED | OUTPATIENT
Start: 2022-07-12 | End: 2022-12-08 | Stop reason: SDUPTHER

## 2022-07-12 RX ORDER — AMLODIPINE BESYLATE 5 MG/1
TABLET ORAL
Qty: 90 TABLET | Refills: 1 | Status: SHIPPED | OUTPATIENT
Start: 2022-07-12 | End: 2022-12-08 | Stop reason: SDUPTHER

## 2022-07-12 NOTE — TELEPHONE ENCOUNTER
Rx Refill Note  Requested Prescriptions     Pending Prescriptions Disp Refills   • levothyroxine (SYNTHROID, LEVOTHROID) 75 MCG tablet [Pharmacy Med Name: LEVOTHYROXINE 75 MCG TABLET] 90 tablet 1     Sig: TAKE ONE TABLET BY MOUTH DAILY   • amLODIPine (NORVASC) 5 MG tablet [Pharmacy Med Name: amLODIPine BESYLATE 5 MG TAB] 90 tablet 1     Sig: TAKE ONE TABLET BY MOUTH DAILY      Last office visit with prescribing clinician: 5/11/2022      Next office visit with prescribing clinician: 12/8/2022            Florecita Loomis MA  07/12/22, 08:53 EDT

## 2022-11-01 ENCOUNTER — TELEPHONE (OUTPATIENT)
Dept: OBSTETRICS AND GYNECOLOGY | Facility: CLINIC | Age: 62
End: 2022-11-01

## 2022-11-01 DIAGNOSIS — N60.01 BREAST CYST, RIGHT: ICD-10-CM

## 2022-11-01 DIAGNOSIS — Z09 FOLLOW-UP EXAM, 3-6 MONTHS SINCE PREVIOUS EXAM: Primary | ICD-10-CM

## 2022-11-01 NOTE — TELEPHONE ENCOUNTER
Pt worked with [facility] and scheduled her 6 mth follow up DX Bilat Mammo & Rt Limited US for 11/3/22 @ 2pm.  SR

## 2022-11-03 ENCOUNTER — TELEPHONE (OUTPATIENT)
Dept: FAMILY MEDICINE CLINIC | Facility: CLINIC | Age: 62
End: 2022-11-03

## 2022-11-03 ENCOUNTER — APPOINTMENT (OUTPATIENT)
Dept: WOMENS IMAGING | Facility: HOSPITAL | Age: 62
End: 2022-11-03

## 2022-11-03 DIAGNOSIS — Z00.00 ANNUAL PHYSICAL EXAM: ICD-10-CM

## 2022-11-03 DIAGNOSIS — E78.2 MIXED HYPERLIPIDEMIA: Primary | ICD-10-CM

## 2022-11-03 DIAGNOSIS — E03.9 ACQUIRED HYPOTHYROIDISM: ICD-10-CM

## 2022-11-03 PROCEDURE — 77066 DX MAMMO INCL CAD BI: CPT | Performed by: RADIOLOGY

## 2022-11-03 PROCEDURE — 76642 ULTRASOUND BREAST LIMITED: CPT | Performed by: RADIOLOGY

## 2022-11-03 PROCEDURE — G0279 TOMOSYNTHESIS, MAMMO: HCPCS | Performed by: RADIOLOGY

## 2022-11-03 PROCEDURE — 77062 BREAST TOMOSYNTHESIS BI: CPT | Performed by: RADIOLOGY

## 2022-11-17 DIAGNOSIS — N60.01 BREAST CYST, RIGHT: ICD-10-CM

## 2022-11-17 DIAGNOSIS — Z09 FOLLOW-UP EXAM, 3-6 MONTHS SINCE PREVIOUS EXAM: ICD-10-CM

## 2022-11-17 NOTE — TELEPHONE ENCOUNTER
LM for pt to call to discuss recent results from DX Mammo & US.      Results were stable and suggest she return in 6 mths for a follow up - RT Limited US- at [facility] in May 2023. I also have on file to follow.  SR

## 2022-11-18 NOTE — TELEPHONE ENCOUNTER
Pt returned call and is aware of stable results.  She will call [facility] to schedule for May 2023.

## 2022-11-29 ENCOUNTER — OFFICE VISIT (OUTPATIENT)
Dept: OBSTETRICS AND GYNECOLOGY | Facility: CLINIC | Age: 62
End: 2022-11-29

## 2022-11-29 VITALS
SYSTOLIC BLOOD PRESSURE: 123 MMHG | DIASTOLIC BLOOD PRESSURE: 82 MMHG | HEIGHT: 65 IN | WEIGHT: 130 LBS | BODY MASS INDEX: 21.66 KG/M2

## 2022-11-29 DIAGNOSIS — Z01.419 WOMEN'S ANNUAL ROUTINE GYNECOLOGICAL EXAMINATION: Primary | ICD-10-CM

## 2022-11-29 DIAGNOSIS — Z78.0 POSTMENOPAUSE: ICD-10-CM

## 2022-11-29 DIAGNOSIS — Z78.0 POSTMENOPAUSAL STATE: ICD-10-CM

## 2022-11-29 LAB
BILIRUB BLD-MCNC: NEGATIVE MG/DL
GLUCOSE UR STRIP-MCNC: NEGATIVE MG/DL
KETONES UR QL: NEGATIVE
LEUKOCYTE EST, POC: NEGATIVE
NITRITE UR-MCNC: NEGATIVE MG/ML
PH UR: 6 [PH] (ref 5–8)
PROT UR STRIP-MCNC: ABNORMAL MG/DL
RBC # UR STRIP: NEGATIVE /UL
SP GR UR: 1.02 (ref 1–1.03)
UROBILINOGEN UR QL: ABNORMAL

## 2022-11-29 PROCEDURE — 81002 URINALYSIS NONAUTO W/O SCOPE: CPT | Performed by: NURSE PRACTITIONER

## 2022-11-29 PROCEDURE — 99396 PREV VISIT EST AGE 40-64: CPT | Performed by: NURSE PRACTITIONER

## 2022-11-29 RX ORDER — ESTRADIOL 0.1 MG/G
1 CREAM VAGINAL 2 TIMES WEEKLY
Qty: 42.5 G | Refills: 2 | Status: SHIPPED | OUTPATIENT
Start: 2022-12-01

## 2022-11-29 NOTE — PROGRESS NOTES
GYN Annual Exam     Chief Complaint   Patient presents with   • Gynecologic Exam     AE, Last pap         Kasia Carroll is a 62 y.o. female who presents for annual well woman exam. She is postmenopausal. She denies vaginal spotting or discharge. She completes SBE monthly.   She is a patient of Dr. Danielle.  Using estrace vaginal cream for vaginal dryness with good relief of symptoms.  She would like a refill at this time.    OB History        2    Para   2    Term   2            AB        Living   2       SAB        IAB        Ectopic        Molar        Multiple        Live Births   2              Mammogram: 2022  Dexa scan: , osteopenia FRAX 9.9 & 1.5  Colonoscopy: 3/2022  Last Pap : 2020 Unsatisfactory, HPV negative. 2017 NIL   History of abnormal Pap smear: no  Family history of uterine, colon or ovarian cancer: no  Family history of breast cancer: no  History of abnormal mammogram: yes, has rpt imaging scheduled in 5 months     Menopause:  Bleeding since? no  Vasomotor symptoms: no  HRT: yes  Dyspareunia: no    Past Medical History:   Diagnosis Date   • Disease of thyroid gland    • History of postmenopausal HRT [id number]   • Hypertension    • Silicone leakage from breast implant    • Urinary tract infection        Past Surgical History:   Procedure Laterality Date   • BREAST AUGMENTATION  1989, redone due to leakage.   • BREAST CAPSULOTOMY, IMPLANT REVISION     • BREAST EXCISIONAL BIOPSY Right     Dr. Bentley Patino, benign   • COLONOSCOPY     • COLONOSCOPY N/A 3/8/2022    Procedure: COLONOSCOPY WITH POLYPECTOMY;  Surgeon: Gorge Rogers MD;  Location: [facility];  Service: Gastroenterology;  Laterality: N/A;  polyps, diverticulosis   • COSMETIC SURGERY      eyelids lifted/forehead   • TOE AMPUTATION Left 1964         Current Outpatient Medications:   •  amLODIPine (NORVASC) 5 MG tablet, TAKE ONE TABLET BY MOUTH DAILY, Disp: 90 tablet, Rfl: 1  •  " CBD (cannabidiol) oral oil, Take  by mouth., Disp: , Rfl:   •  [START ON 2022] estradiol (ESTRACE) 0.1 MG/GM vaginal cream, Insert 1 g into the vagina 2 (Two) Times a Week., Disp: 42.5 g, Rfl: 2  •  levothyroxine (SYNTHROID, LEVOTHROID) 75 MCG tablet, TAKE ONE TABLET BY MOUTH DAILY, Disp: 90 tablet, Rfl: 1  •  Multiple Vitamins-Minerals (MULTIVITAMIN WITH MINERALS) tablet tablet, Take 1 tablet by mouth Daily., Disp: , Rfl:   •  Probiotic Product (PROBIOTIC DAILY PO), Take  by mouth., Disp: , Rfl:   •  cephalexin (KEFLEX) 500 MG capsule, Take 1 capsule by mouth 2 (Two) Times a Day for 7 days., Disp: 14 capsule, Rfl: 0    No Known Allergies    Social History     Tobacco Use   • Smoking status: Former     Packs/day: 0.50     Types: Cigarettes     Quit date: 2013     Years since quittin.9   • Smokeless tobacco: Never   Vaping Use   • Vaping Use: Never used   Substance Use Topics   • Alcohol use: Yes     Comment: BEER WEEKLY    • Drug use: No       Family History   Problem Relation Age of Onset   • Hypertension Mother    • Alzheimer's disease Mother    • Prostate cancer Father        Review of Systems   Constitutional: Negative for chills, fatigue and fever.   Gastrointestinal: Negative for abdominal distention, abdominal pain, nausea and vomiting.   Endocrine: Negative for cold intolerance and heat intolerance.   Genitourinary: Negative for dyspareunia, dysuria, menstrual problem, pelvic pain, vaginal bleeding, vaginal discharge and vaginal pain.   Musculoskeletal: Negative for gait problem.   Skin: Negative for rash.   Neurological: Negative for dizziness and headaches.   Hematological: Does not bruise/bleed easily.   Psychiatric/Behavioral: Negative for behavioral problems.       /82   Ht 165.1 cm (65\")   Wt 59 kg (130 lb)   BMI 21.63 kg/m²     Physical Exam  Constitutional:       General: She is not in acute distress.     Appearance: Normal appearance. She is not ill-appearing, toxic-appearing " or diaphoretic.   Genitourinary:      Vulva, bladder and urethral meatus normal.      No lesions in the vagina.      Right Labia: No rash, tenderness, lesions, skin changes or Bartholin's cyst.     Left Labia: No tenderness, lesions, skin changes, Bartholin's cyst or rash.     No labial fusion noted.      No inguinal adenopathy present in the right or left side.     No vaginal discharge, erythema, tenderness, bleeding or ulceration.      No vaginal prolapse present.     No vaginal atrophy present.       Right Adnexa: not tender, not full, not palpable, no mass present and not absent.     Left Adnexa: not tender, not full, not palpable, no mass present and not absent.     No cervical motion tenderness, discharge, friability, lesion, polyp, nabothian cyst or eversion.      Uterus is not enlarged, fixed, tender, irregular or prolapsed.      No uterine mass detected.     No urethral tenderness or mass present.      Pelvic exam was performed with patient in the lithotomy position.   Breasts:     Breasts are symmetrical.      Right: Present. No swelling, bleeding, inverted nipple, mass, nipple discharge, skin change, tenderness or breast implant.      Left: Present. No swelling, bleeding, inverted nipple, mass, nipple discharge, skin change, tenderness or breast implant.   HENT:      Head: Normocephalic and atraumatic.   Eyes:      Pupils: Pupils are equal, round, and reactive to light.   Cardiovascular:      Rate and Rhythm: Normal rate.   Pulmonary:      Effort: Pulmonary effort is normal.   Abdominal:      General: There is no distension.      Palpations: Abdomen is soft. There is no mass.      Tenderness: There is no abdominal tenderness. There is no guarding.      Hernia: No hernia is present. There is no hernia in the left inguinal area or right inguinal area.   Musculoskeletal:         General: Normal range of motion.      Cervical back: Normal range of motion and neck supple. No tenderness.   Lymphadenopathy:       Cervical: No cervical adenopathy.      Upper Body:      Right upper body: No supraclavicular, axillary or pectoral adenopathy.      Left upper body: No supraclavicular, axillary or pectoral adenopathy.      Lower Body: No right inguinal adenopathy. No left inguinal adenopathy.   Neurological:      General: No focal deficit present.      Mental Status: She is alert and oriented to person, place, and time.      Cranial Nerves: No cranial nerve deficit.   Skin:     General: Skin is warm and dry.   Psychiatric:         Mood and Affect: Mood normal.         Behavior: Behavior normal.         Thought Content: Thought content normal.         Judgment: Judgment normal.   Vitals and nursing note reviewed.         Assessment    Diagnoses and all orders for this visit:    1. Women's annual routine gynecological examination (Primary)  -     POC Urinalysis Dipstick  -     IGP, Apt HPV,rfx 16 / 18,45    2. Postmenopause  -     DEXA Bone Density Axial; Future    3. Postmenopausal state  -     estradiol (ESTRACE) 0.1 MG/GM vaginal cream; Insert 1 g into the vagina 2 (Two) Times a Week.  Dispense: 42.5 g; Refill: 2         Plan     1) Breast Health - Clinical breast exam & mammogram yearly, Self breast awareness. Schedule screening mammogram.   2) Pap - collected   3) STD-no  4) Smoking status- former smoker  5) Colon health - screening colonoscopy recommended if not up to date  6) BMI is within normal parameters. No other follow-up for BMI required.  7) Bone health - Weight bearing exercise, dietary calcium recommendations and vitamin D  reviewed. Dexa ordered  8) Encouraged 150 minutes of exercise per week if not medically contraindicated    Follow up prn and one year    Poonam Darling, APRN  11/29/2022  11:49 EST

## 2022-12-08 ENCOUNTER — OFFICE VISIT (OUTPATIENT)
Dept: FAMILY MEDICINE CLINIC | Facility: CLINIC | Age: 62
End: 2022-12-08

## 2022-12-08 VITALS
HEART RATE: 81 BPM | DIASTOLIC BLOOD PRESSURE: 77 MMHG | BODY MASS INDEX: 22.33 KG/M2 | OXYGEN SATURATION: 98 % | SYSTOLIC BLOOD PRESSURE: 128 MMHG | TEMPERATURE: 97.1 F | HEIGHT: 65 IN | WEIGHT: 134 LBS | RESPIRATION RATE: 18 BRPM

## 2022-12-08 DIAGNOSIS — E03.9 ACQUIRED HYPOTHYROIDISM: ICD-10-CM

## 2022-12-08 DIAGNOSIS — E78.2 MIXED HYPERLIPIDEMIA: ICD-10-CM

## 2022-12-08 DIAGNOSIS — Z00.00 ANNUAL PHYSICAL EXAM: Primary | ICD-10-CM

## 2022-12-08 DIAGNOSIS — I10 ESSENTIAL HYPERTENSION: ICD-10-CM

## 2022-12-08 PROCEDURE — 99396 PREV VISIT EST AGE 40-64: CPT | Performed by: NURSE PRACTITIONER

## 2022-12-08 RX ORDER — AMLODIPINE BESYLATE 5 MG/1
5 TABLET ORAL DAILY
Qty: 90 TABLET | Refills: 1 | Status: SHIPPED | OUTPATIENT
Start: 2022-12-08

## 2022-12-08 RX ORDER — LEVOTHYROXINE SODIUM 0.07 MG/1
75 TABLET ORAL DAILY
Qty: 90 TABLET | Refills: 1 | Status: SHIPPED | OUTPATIENT
Start: 2022-12-08

## 2022-12-08 NOTE — PROGRESS NOTES
"Chief Complaint  Annual Exam    Subjective        Kasia Carroll presents to [facility]  History of Present Illness  The patient presents for an annual physical.     The patient has been experiencing intermittent discomfort in her right mid abdomen. She suspects it may be gas related.     The patient has been consistently taking red rice yeast 2 in the morning. She is having regular bowel movements. She has a  that comes to her house twice a week.     The patient's last COVID-19 booster was on 10/19/2022. She has recently seen her OBGYN. Her bone density scan was ordered and she should be contacted to schedule those soon. She had a colonoscopy done this year and she was told to undergo a repeat in 3 years as she did have polyps.     Objective   Vital Signs:  /77 (BP Location: Right arm, Patient Position: Sitting, Cuff Size: Adult)   Pulse 81   Temp 97.1 °F (36.2 °C) (Temporal)   Resp 18   Ht 165.1 cm (65\")   Wt 60.8 kg (134 lb)   SpO2 98%   BMI 22.30 kg/m²   Estimated body mass index is 22.3 kg/m² as calculated from the following:    Height as of this encounter: 165.1 cm (65\").    Weight as of this encounter: 60.8 kg (134 lb).    BMI is within normal parameters. No other follow-up for BMI required.      Physical Exam  Vitals reviewed.   Constitutional:       General: She is not in acute distress.     Appearance: She is well-developed. She is not diaphoretic.   HENT:      Head: Normocephalic and atraumatic.      Right Ear: Tympanic membrane, ear canal and external ear normal.      Left Ear: Tympanic membrane, ear canal and external ear normal.      Nose: Nose normal.      Mouth/Throat:      Pharynx: Uvula midline. No oropharyngeal exudate.   Cardiovascular:      Rate and Rhythm: Normal rate and regular rhythm.      Heart sounds: Normal heart sounds. No murmur heard.    No friction rub. No gallop.   Pulmonary:      Effort: Pulmonary effort is normal. No respiratory " distress.      Breath sounds: Normal breath sounds. No wheezing or rales.   Abdominal:      General: Bowel sounds are normal. There is no distension.      Palpations: Abdomen is soft.      Tenderness: There is no abdominal tenderness.   Musculoskeletal:      Cervical back: Neck supple.   Skin:     General: Skin is warm and dry.   Neurological:      Mental Status: She is alert and oriented to person, place, and time.        Result Review :      CMP    CMP 5/9/22 12/1/22   Glucose 100 (A) 93   BUN 6 (A) 4 (A)   Creatinine 0.54 (A) 0.55 (A)   Sodium 134 (A) 136   Potassium 4.2 4.4   Chloride 94 (A) 96 (A)   Calcium 9.7 9.6   Total Protein  7.3   Albumin 5.20 5.20   Globulin  2.1   Total Bilirubin 0.7 0.5   Alkaline Phosphatase 43 44   AST (SGOT) 44 (A) 45 (A)   ALT (SGPT) 29 30   (A) Abnormal value            CBC    CBC 12/1/22   WBC 3.41   RBC 4.03   Hemoglobin 14.0   Hematocrit 40.7   .0 (A)   MCH 34.7 (A)   MCHC 34.4   RDW 12.0 (A)   Platelets 228   (A) Abnormal value            Lipid Panel    Lipid Panel 5/9/22 12/1/22   Total Cholesterol 281 (A)    Total Cholesterol  276 (A)   Triglycerides 58 88   HDL Cholesterol 118 (A) 111 (A)   VLDL Cholesterol 9 14   LDL Cholesterol  154 (A) 151 (A)   LDL/HDL Ratio 1.28 1.33   (A) Abnormal value       Comments are available for some flowsheets but are not being displayed.           TSH    TSH 12/1/22   TSH 1.610                     Assessment and Plan   Diagnoses and all orders for this visit:    1. Annual physical exam (Primary)    2. Mixed hyperlipidemia    3. Essential hypertension    4. Acquired hypothyroidism    Other orders  -     levothyroxine (SYNTHROID, LEVOTHROID) 75 MCG tablet; Take 1 tablet by mouth Daily.  Dispense: 90 tablet; Refill: 1  -     amLODIPine (NORVASC) 5 MG tablet; Take 1 tablet by mouth Daily.  Dispense: 90 tablet; Refill: 1      Annual physical  - Labs reviewed with patient at time of visit.   - Her Pap, mammo up to date.   - She has got a  DEXA scan ordered, awaiting scheduling.   - Colonoscopy is due in 2025.   - All vaccines are up to date at this time.   - Refills given on her medications.    Hyperlipidemia  - Patient was encouraged to take red rice yeast 2 pills twice daily for the next 3 months  - She will follow-up for labs.      Information/counseling provided to the patient regarding periodic abimael maintenance recommendations, including but not limited to immunizations, diet/exercise/healthy lifestyle, laboratory, and other screenings. BMI is discussed. Appropriate exercise, diet, and weight plans are discussed.      Follow Up   No follow-ups on file.'  Patient will follow-up in 1 year, sooner if needed.     Patient was given instructions and counseling regarding her condition or for health maintenance advice. Please see specific information pulled into the AVS if appropriate.       Transcribed from ambient dictation for GENIE Duff by Freda Torres.  12/08/22   16:13 EST    Patient or patient representative verbalized consent to the visit recording.  I have personally performed the services described in this document as transcribed by the above individual, and it is both accurate and complete.

## 2022-12-12 LAB
CYTOLOGIST CVX/VAG CYTO: NORMAL
CYTOLOGY CVX/VAG DOC CYTO: NORMAL
CYTOLOGY CVX/VAG DOC THIN PREP: NORMAL
DX ICD CODE: NORMAL
HIV 1 & 2 AB SER-IMP: NORMAL
HPV I/H RISK 4 DNA CVX QL PROBE+SIG AMP: NEGATIVE
OTHER STN SPEC: NORMAL
STAT OF ADQ CVX/VAG CYTO-IMP: NORMAL

## 2023-03-07 ENCOUNTER — OFFICE VISIT (OUTPATIENT)
Dept: FAMILY MEDICINE CLINIC | Facility: CLINIC | Age: 63
End: 2023-03-07
Payer: COMMERCIAL

## 2023-03-07 VITALS
BODY MASS INDEX: 21.73 KG/M2 | HEIGHT: 65 IN | RESPIRATION RATE: 18 BRPM | DIASTOLIC BLOOD PRESSURE: 78 MMHG | TEMPERATURE: 96.9 F | HEART RATE: 75 BPM | SYSTOLIC BLOOD PRESSURE: 132 MMHG | WEIGHT: 130.4 LBS | OXYGEN SATURATION: 98 %

## 2023-03-07 DIAGNOSIS — J06.9 UPPER RESPIRATORY TRACT INFECTION, UNSPECIFIED TYPE: ICD-10-CM

## 2023-03-07 DIAGNOSIS — R81 GLYCOSURIA: Primary | ICD-10-CM

## 2023-03-07 PROCEDURE — 99213 OFFICE O/P EST LOW 20 MIN: CPT | Performed by: NURSE PRACTITIONER

## 2023-03-07 RX ORDER — AZITHROMYCIN 250 MG/1
TABLET, FILM COATED ORAL
Qty: 6 TABLET | Refills: 0 | Status: SHIPPED | OUTPATIENT
Start: 2023-03-07

## 2023-03-07 NOTE — PROGRESS NOTES
"Chief Complaint  Follow-up (UTI, had sugar in urine) and Cough (X 2 weeks)    Subjective        Kasia Carroll presents to [facility]  History of Present Illness      Kasia Carroll is a 62-year-old female who presents to the clinic for follow-up of UTI with possible glycosuria as well as a cough for the past 2 weeks.    The patient reports she is doing well. She denies utilizing Pyridium or Azo. She adds her symptoms have improved.    She denies having a family history of diabetes.    The patient reports she has had a cough for approximately 2 weeks. She states it started as a drainage with a tingling, so she coughed to get rid of it. She adds it turned into a deeper cough. She states her  had bronchitis. She notes he was treated with a Z-Justice, however it did not help her. She adds her cough is getting better. She denies dyspnea or wheezing. She denies her ears bothering her. She adds she feels more tired than normal.    Complete ROS reviewed and negative except as mentioned in the HPI.    Objective   Vital Signs:  /78 (BP Location: Right arm, Patient Position: Sitting, Cuff Size: Adult)   Pulse 75   Temp 96.9 °F (36.1 °C) (Temporal)   Resp 18   Ht 165.1 cm (65\")   Wt 59.1 kg (130 lb 6.4 oz)   SpO2 98%   BMI 21.70 kg/m²   Estimated body mass index is 21.7 kg/m² as calculated from the following:    Height as of this encounter: 165.1 cm (65\").    Weight as of this encounter: 59.1 kg (130 lb 6.4 oz).    BMI is within normal parameters. No other follow-up for BMI required.      Physical Exam  Vitals reviewed.   Constitutional:       Appearance: Normal appearance.   Cardiovascular:      Rate and Rhythm: Normal rate and regular rhythm.      Heart sounds: No murmur heard.    No friction rub. No gallop.   Pulmonary:      Effort: Pulmonary effort is normal. No respiratory distress.      Breath sounds: Normal breath sounds. No wheezing, rhonchi or rales.   Skin:     General: " Skin is warm and dry.   Neurological:      Mental Status: She is alert and oriented to person, place, and time.        Result Review :                Assessment and Plan   Diagnoses and all orders for this visit:    1. Glycosuria (Primary)  -     Hemoglobin A1c  -     Comprehensive metabolic panel    2. Upper respiratory tract infection, unspecified type    Other orders  -     azithromycin (Zithromax Z-Justice) 250 MG tablet; Take 2 tablets the first day, then 1 tablet daily for 4 days.  Dispense: 6 tablet; Refill: 0            1. Glycosuria  - The patient with a UA that did show some glucose in her urine. She is unsure if she was taking Azo at the time as that could cause abnormal readings. We are going to check an A1c and a CMP today. Her last CMP showed a normal glucose, so doubtful this is diabetes unless she is spiking throughout the day. She does have a cough that has been going on for 2 weeks. Her lungs are clear. Oxygen is in normal limits. Wait and see Z-Justice has been sent to her pharmacy with instructions for use.        Follow Up   No follow-ups on file.  Patient was given instructions and counseling regarding her condition or for health maintenance advice. Please see specific information pulled into the AVS if appropriate.       Transcribed from ambient dictation for GENIE Duff by Dvaion Waters.  03/07/23   14:26 EST    Patient or patient representative verbalized consent to the visit recording.  I have personally performed the services described in this document as transcribed by the above individual, and it is both accurate and complete.  Answers for HPI/ROS submitted by the patient on 3/6/2023  Please describe your symptoms.: I recently had a UTI and went to the [facility].  The results were positive and they also saw on the results that I had sugar in my urine and suggested I see my doctor.   I've had UTIs before but none with results showing sugar in urine.  , , I also have a  cough that I need looked at.  Have you had these symptoms before?: No  How long have you been having these symptoms?: Greater than 2 weeks  Please list any medications you are currently taking for this condition.: Already finished meds for the UTI  Please describe any probable cause for these symptoms. : Do not know.  What is the primary reason for your visit?: Other

## 2023-04-10 ENCOUNTER — TELEPHONE (OUTPATIENT)
Dept: FAMILY MEDICINE CLINIC | Facility: CLINIC | Age: 63
End: 2023-04-10

## 2023-04-10 NOTE — TELEPHONE ENCOUNTER
"Caller: Kasia Carroll \"Valencia\"    Relationship: Self    Best call back number: [phone]    What medication are you requesting: ANTIBIOTIC     What are your current symptoms: PRESSURE, FREQUENCY WITH URINATION      How long have you been experiencing symptoms: A COUPLE OF WEEKS AGO     Have you had these symptoms before:    [x] Yes  [] No    Have you been treated for these symptoms before:   [x] Yes  [] No    If a prescription is needed, what is your preferred pharmacy and phone number: [id number] - [address]  [phone]  - [phone]      Additional notes:          "

## 2023-04-11 ENCOUNTER — OFFICE VISIT (OUTPATIENT)
Dept: FAMILY MEDICINE CLINIC | Facility: CLINIC | Age: 63
End: 2023-04-11
Payer: COMMERCIAL

## 2023-04-11 VITALS
OXYGEN SATURATION: 97 % | WEIGHT: 129.9 LBS | DIASTOLIC BLOOD PRESSURE: 72 MMHG | RESPIRATION RATE: 18 BRPM | HEIGHT: 65 IN | BODY MASS INDEX: 21.64 KG/M2 | SYSTOLIC BLOOD PRESSURE: 122 MMHG | TEMPERATURE: 96.6 F | HEART RATE: 102 BPM

## 2023-04-11 DIAGNOSIS — R30.0 DYSURIA: Primary | ICD-10-CM

## 2023-04-11 LAB
BILIRUB BLD-MCNC: NEGATIVE MG/DL
CLARITY, POC: CLEAR
COLOR UR: YELLOW
EXPIRATION DATE: NORMAL
GLUCOSE UR STRIP-MCNC: NEGATIVE MG/DL
KETONES UR QL: NEGATIVE
LEUKOCYTE EST, POC: NEGATIVE
Lab: NORMAL
NITRITE UR-MCNC: NEGATIVE MG/ML
PH UR: 6.5 [PH] (ref 5–8)
PROT UR STRIP-MCNC: NEGATIVE MG/DL
RBC # UR STRIP: NEGATIVE /UL
SP GR UR: 1.02 (ref 1–1.03)
UROBILINOGEN UR QL: NORMAL

## 2023-04-11 PROCEDURE — 99213 OFFICE O/P EST LOW 20 MIN: CPT | Performed by: NURSE PRACTITIONER

## 2023-04-11 PROCEDURE — 81003 URINALYSIS AUTO W/O SCOPE: CPT | Performed by: NURSE PRACTITIONER

## 2023-04-11 RX ORDER — NITROFURANTOIN 25; 75 MG/1; MG/1
100 CAPSULE ORAL 2 TIMES DAILY
Qty: 14 CAPSULE | Refills: 0 | Status: SHIPPED | OUTPATIENT
Start: 2023-04-11

## 2023-04-11 RX ORDER — PHENAZOPYRIDINE HYDROCHLORIDE 200 MG/1
200 TABLET, FILM COATED ORAL 3 TIMES DAILY PRN
Qty: 6 TABLET | Refills: 0 | Status: SHIPPED | OUTPATIENT
Start: 2023-04-11

## 2023-04-11 NOTE — PROGRESS NOTES
"Chief Complaint  Urinary Tract Infection (Took azo Sunday, x 2weeks coming and going)    Subjective        Kasia Carroll presents to [facility]  History of Present Illness    The patient is a 62-year-old female who presents today for intermittent UTI for the past 2 weeks.    The patient reports she is experiencing urinary frequency and pressure. She denies taking any antibiotics. She notes she took AZO on 04/09/2023. She bought some test strips and one of them did not show anything, and the other one showed purple at the top. She is drinking water. She does drink coffee, but does not drink soft drinks. She reports she did have a little bit of back pain; however, she does not now. She denies vaginal discharge or yeast infection type symptoms. She denies any allergies to medications. She would like to do a prescription strength AZO. She adds her urine was bad.     She has a  who comes to her house for her workouts.     Objective   Vital Signs:  /72 (BP Location: Left arm, Patient Position: Sitting, Cuff Size: Adult)   Pulse 102   Temp 96.6 °F (35.9 °C) (Temporal)   Resp 18   Ht 165.1 cm (65\")   Wt 58.9 kg (129 lb 14.4 oz)   SpO2 97%   BMI 21.62 kg/m²   Estimated body mass index is 21.62 kg/m² as calculated from the following:    Height as of this encounter: 165.1 cm (65\").    Weight as of this encounter: 58.9 kg (129 lb 14.4 oz).    BMI is within normal parameters. No other follow-up for BMI required.      Physical Exam  Constitutional:       General: She is not in acute distress.     Appearance: She is well-developed. She is not diaphoretic.   Cardiovascular:      Rate and Rhythm: Normal rate and regular rhythm.      Heart sounds: Normal heart sounds. No murmur heard.    No friction rub. No gallop.   Pulmonary:      Effort: Pulmonary effort is normal. No respiratory distress.      Breath sounds: Normal breath sounds. No wheezing or rales.   Abdominal:      " General: Bowel sounds are normal. There is no distension.      Palpations: Abdomen is soft.      Tenderness: There is no abdominal tenderness.   Musculoskeletal:      Cervical back: Neck supple.   Skin:     General: Skin is warm and dry.   Neurological:      Mental Status: She is alert and oriented to person, place, and time.        Result Review :                Assessment and Plan   Diagnoses and all orders for this visit:    1. Dysuria (Primary)  -     POC Urinalysis Dipstick, Automated  -     Urine Culture - Urine, Urine, Clean Catch    Other orders  -     nitrofurantoin, macrocrystal-monohydrate, (Macrobid) 100 MG capsule; Take 1 capsule by mouth 2 (Two) Times a Day.  Dispense: 14 capsule; Refill: 0  -     phenazopyridine (Pyridium) 200 MG tablet; Take 1 tablet by mouth 3 (Three) Times a Day As Needed for Bladder Spasms.  Dispense: 6 tablet; Refill: 0      1. Urinary tract infection   - Patient with urinary tract symptoms. She did have a positive leukocyte at home. We are going to proceed with Macrobid twice a day for 7 days. Culture the urine recommended. If it is negative, we will discontinue the antibiotic. Encouraged fluids. Discussed holding caffeine. She verbalized understanding.       Follow Up   No follow-ups on file.  Patient was given instructions and counseling regarding her condition or for health maintenance advice. Please see specific information pulled into the AVS if appropriate.       Transcribed from ambient dictation for GENIE Duff by Kelsie Allred.  04/11/23   18:48 EDT    Patient or patient representative verbalized consent to the visit recording.  I have personally performed the services described in this document as transcribed by the above individual, and it is both accurate and complete.

## 2023-04-11 NOTE — TELEPHONE ENCOUNTER
"  Hub staff attempted to follow warm transfer process and was unsuccessful     Caller: Kasia Carroll \"Valencia\"    Relationship to patient: Self    Best call back number: [phone]    Patient is needing: PATIENT IS RETURNING EMILY'S CALL. PATIENT STATES SHE CAN COME IN TODAY FOR AN APPOINTMENT. HUB IS UNABLE TO SCHEDULE APPOINTMENT    PLEASE CALL AND ADVISE      "

## 2023-04-11 NOTE — TELEPHONE ENCOUNTER
Left Vm for patient that Bianca ok to see her at 4 today, even though shell be over her limit. Hub please schedule if patient calls back for 4 pm today or transfer to me. Provider ok this.

## 2023-04-13 LAB
BACTERIA UR CULT: NORMAL
BACTERIA UR CULT: NORMAL

## 2023-04-14 ENCOUNTER — ANCILLARY ORDERS (OUTPATIENT)
Dept: WOMENS IMAGING | Facility: HOSPITAL | Age: 63
End: 2023-04-14
Payer: COMMERCIAL

## 2023-04-14 DIAGNOSIS — N63.10 MASS OF RIGHT BREAST, UNSPECIFIED QUADRANT: ICD-10-CM

## 2023-05-03 ENCOUNTER — HOSPITAL ENCOUNTER (OUTPATIENT)
Dept: PET IMAGING | Facility: HOSPITAL | Age: 63
Discharge: HOME | End: 2023-05-03
Payer: COMMERCIAL

## 2023-05-03 DIAGNOSIS — Z78.0 POSTMENOPAUSE: ICD-10-CM

## 2023-05-03 PROCEDURE — 77080 DXA BONE DENSITY AXIAL: CPT | Performed by: RADIOLOGY

## 2023-05-03 PROCEDURE — 77080 DXA BONE DENSITY AXIAL: CPT

## 2023-05-08 NOTE — PROGRESS NOTES
Melany, please call this pt and discuss Dexa scan results which continue to show osteopenia, however she has increased risk for fracture with these results which is a change from prior imaging. I would recommend she have a follow appointment with myself or Dr. العراقي to discuss treatment options. Thank you

## 2023-05-11 ENCOUNTER — HOSPITAL ENCOUNTER (OUTPATIENT)
Dept: GENERAL RADIOLOGY | Facility: HOSPITAL | Age: 63
Discharge: HOME | End: 2023-05-11
Payer: COMMERCIAL

## 2023-05-11 ENCOUNTER — TELEPHONE (OUTPATIENT)
Dept: OBSTETRICS AND GYNECOLOGY | Facility: CLINIC | Age: 63
End: 2023-05-11
Payer: COMMERCIAL

## 2023-05-11 ENCOUNTER — OFFICE VISIT (OUTPATIENT)
Dept: FAMILY MEDICINE CLINIC | Facility: CLINIC | Age: 63
End: 2023-05-11
Payer: COMMERCIAL

## 2023-05-11 VITALS
TEMPERATURE: 96.2 F | HEART RATE: 94 BPM | RESPIRATION RATE: 18 BRPM | OXYGEN SATURATION: 96 % | WEIGHT: 130.9 LBS | SYSTOLIC BLOOD PRESSURE: 122 MMHG | HEIGHT: 65 IN | DIASTOLIC BLOOD PRESSURE: 78 MMHG | BODY MASS INDEX: 21.81 KG/M2

## 2023-05-11 DIAGNOSIS — R20.2 NUMBNESS AND TINGLING: Primary | ICD-10-CM

## 2023-05-11 DIAGNOSIS — R20.2 NUMBNESS AND TINGLING: ICD-10-CM

## 2023-05-11 DIAGNOSIS — R20.0 NUMBNESS AND TINGLING: ICD-10-CM

## 2023-05-11 DIAGNOSIS — R20.0 NUMBNESS AND TINGLING: Primary | ICD-10-CM

## 2023-05-11 PROCEDURE — 72100 X-RAY EXAM L-S SPINE 2/3 VWS: CPT

## 2023-05-11 PROCEDURE — 99214 OFFICE O/P EST MOD 30 MIN: CPT | Performed by: NURSE PRACTITIONER

## 2023-05-11 PROCEDURE — 72040 X-RAY EXAM NECK SPINE 2-3 VW: CPT

## 2023-05-11 NOTE — TELEPHONE ENCOUNTER
Left detailed message for pt about her results from recent Breast US.  If she understood report from viewing in [employer] then she could call [facility] at [phone] to schedule her 6 mth follow up DX Bilat Mammo & Right Limited US for Nov 2023.  If she had questions I left our number to call the office.    
Implemented All Fall with Harm Risk Interventions:  [place] to call system. Call bell, personal items and telephone within reach. Instruct patient to call for assistance. Room bathroom lighting operational. Non-slip footwear when patient is off stretcher. Physically safe environment: no spills, clutter or unnecessary equipment. Stretcher in lowest position, wheels locked, appropriate side rails in place. Provide visual cue, wrist band, yellow gown, etc. Monitor gait and stability. Monitor for mental status changes and reorient to person, place, and time. Review medications for side effects contributing to fall risk. Reinforce activity limits and safety measures with patient and family. Provide visual clues: red socks.

## 2023-05-11 NOTE — PROGRESS NOTES
"Chief Complaint  Tingling (Feet, months, fingers, left )    Subjective        Kasia Carroll presents to [facility]  History of Present Illness    The patient is a 62-year-old female who presents today with tingling in her feet and hands.    The patient reports she is doing well overall. She states she has been experiencing numbness and tingling in her bilateral lower extremities and hands for a while. She denies any injuries. She notes she has a  that comes to her house and they do a lot of weights and she is unsure if that could be the cause of her symptoms. She reports the numbness and tingling started in her fingers; however, the numbness and tingling in her feet started more in one foot. She notes she lost a toe. She reports the tingling is not \"killing her\"; however, it is making her nervous. She reports the tingling is more in her toe section. She states sometimes she does not feel it that much when she gets up; however, as the day progresses, she notices it more. She denies tripping or falling. She notes it could be in her head; however, her nephew had cornbread hemp on it, and she rubs it on them. She reports she has some cbd drops; however, she rarely uses them. She denies the pain radiating up her arm or hands.    The following portions of the patient's history were reviewed and updated as appropriate: allergies, current medications, past family history, past medical history, past social history, past surgical history and problem list.    Objective   Vital Signs:  /78 (BP Location: Right arm, Patient Position: Sitting, Cuff Size: Adult)   Pulse 94   Temp 96.2 °F (35.7 °C) (Temporal)   Resp 18   Ht 165.1 cm (65\")   Wt 59.4 kg (130 lb 14.4 oz)   SpO2 96%   BMI 21.78 kg/m²   Estimated body mass index is 21.78 kg/m² as calculated from the following:    Height as of this encounter: 165.1 cm (65\").    Weight as of this encounter: 59.4 kg (130 lb 14.4 " oz).    BMI is within normal parameters. No other follow-up for BMI required.      Physical Exam  Cardiovascular:      Rate and Rhythm: Normal rate and regular rhythm.      Heart sounds: No murmur heard.    No gallop.   Pulmonary:      Effort: Pulmonary effort is normal. No respiratory distress.      Breath sounds: Normal breath sounds. No wheezing, rhonchi or rales.   Feet:      Right foot:      Skin integrity: Skin integrity normal.   Skin:     General: Skin is warm and dry.   Neurological:      Mental Status: She is oriented to person, place, and time.   Psychiatric:         Mood and Affect: Mood normal.        Result Review :                 B12 was 775 3-years ago.   Vitamin D levels was normal.    She has some disc space narrowing at L5 and S1, L4 and L5. In her neck C5 and C6, C6 and C7 lower cervical spine.     Assessment and Plan   Diagnoses and all orders for this visit:    1. Numbness and tingling (Primary)  -     Vitamin B12  -     XR Spine Cervical 2 or 3 View; Future  -     XR Spine Lumbar 2 or 3 View; Future            1. Numbness and tingling of both feet (Primary)  - Vitamin B12.  - X-ray lumbar spine complete 4 or 5 Views.  - Referral to Physical Therapy.  - X-ray cervical spine complete 4 or 5 Views.      Follow Up   No follow-ups on file.  Patient was given instructions and counseling regarding her condition or for health maintenance advice. Please see specific information pulled into the AVS if appropriate.       Transcribed from ambient dictation for GENIE Duff by Davion Waters.  05/11/23   17:39 EDT    Patient or patient representative verbalized consent to the visit recording.  I have personally performed the services described in this document as transcribed by the above individual, and it is both accurate and complete.  Answers for HPI/ROS submitted by the patient on 5/11/2023  Please describe your symptoms.: Tingling sensation in toes  Have you had these symptoms before?:  Yes  How long have you been having these symptoms?: Greater than 2 weeks  Please list any medications you are currently taking for this condition.: None  Please describe any probable cause for these symptoms. : Not sure  What is the primary reason for your visit?: Other

## 2023-05-12 ENCOUNTER — TELEPHONE (OUTPATIENT)
Dept: OBSTETRICS AND GYNECOLOGY | Facility: CLINIC | Age: 63
End: 2023-05-12
Payer: COMMERCIAL

## 2023-05-12 LAB — VIT B12 SERPL-MCNC: 623 PG/ML (ref 211–946)

## 2023-05-12 NOTE — TELEPHONE ENCOUNTER
----- Message from GENIE Montoya sent at 5/8/2023  3:54 PM EDT -----  Melany, please call this pt and discuss Dexa scan results which continue to show osteopenia, however she has increased risk for fracture with these results which is a change from prior imaging. I would recommend she have a follow appointment with adia hartmann or Dr. العراقي to discuss treatment options. Thank you

## 2023-05-22 ENCOUNTER — TRANSCRIBE ORDERS (OUTPATIENT)
Dept: ADMINISTRATIVE | Facility: HOSPITAL | Age: 63
End: 2023-05-22
Payer: COMMERCIAL

## 2023-05-22 DIAGNOSIS — N30.20 CHRONIC CYSTITIS: Primary | ICD-10-CM

## 2023-06-13 ENCOUNTER — OFFICE VISIT (OUTPATIENT)
Dept: OBSTETRICS AND GYNECOLOGY | Facility: CLINIC | Age: 63
End: 2023-06-13
Payer: COMMERCIAL

## 2023-06-13 VITALS
HEART RATE: 77 BPM | DIASTOLIC BLOOD PRESSURE: 83 MMHG | WEIGHT: 132.6 LBS | HEIGHT: 65 IN | SYSTOLIC BLOOD PRESSURE: 133 MMHG | BODY MASS INDEX: 22.09 KG/M2

## 2023-06-13 DIAGNOSIS — M85.80 OSTEOPENIA, UNSPECIFIED LOCATION: Primary | ICD-10-CM

## 2023-06-13 RX ORDER — ALENDRONATE SODIUM 70 MG/1
70 TABLET ORAL
Qty: 12 TABLET | Refills: 3 | Status: SHIPPED | OUTPATIENT
Start: 2023-06-13

## 2023-06-13 NOTE — PROGRESS NOTES
"Chief Complaint   Patient presents with    Follow-up     Patient here to discuss treatment options from dexa scan results.  Having some soreness in her neck.         SUBJECTIVE:     Kasia Carroll is a 63 y.o.  who presents to f/u Dexa scan results. She has no hx bariatric surgery or GERD. Denies hx riley's esophagus. She is taking vitamin D daily and exercising routinely. She has a person . She is not taking calcium. She has been having pain in her next for several weeks.     Past Medical History:   Diagnosis Date    Disease of thyroid gland     History of postmenopausal HRT [id number]    Hyperlipidemia     About 6 months ago    Hypertension     Hypothyroidism     Multiple gestation     Silicone leakage from breast implant     Urinary tract infection       Past Surgical History:   Procedure Laterality Date    BREAST AUGMENTATION  1989, redone due to leakage.    BREAST CAPSULOTOMY, IMPLANT REVISION      BREAST EXCISIONAL BIOPSY Right     Dr. Bentley Patino, benign    BREAST SURGERY      CATARACT EXTRACTION      COLONOSCOPY      COLONOSCOPY N/A 2022    Procedure: COLONOSCOPY WITH POLYPECTOMY;  Surgeon: Gorge Rogers MD;  Location: [facility] MAIN OR;  Service: Gastroenterology;  Laterality: N/A;  polyps, diverticulosis    COSMETIC SURGERY  2009    eyelids lifted/forehead    EYE SURGERY      Cataract    TOE AMPUTATION Left 1964    TONSILLECTOMY      WISDOM TOOTH EXTRACTION            OBJECTIVE:   Vitals:    23 1453   BP: 133/83   Pulse: 77   Weight: 60.1 kg (132 lb 9.6 oz)   Height: 165.1 cm (65\")      Assessment/Plan    Diagnoses and all orders for this visit:    1. Osteopenia, unspecified location (Primary)  -     Vitamin D,25-Hydroxy    Reviewed Dexa scan results indicating osteopenia, she has had this result in the past however previously with a low FRAX. Current FRAX for major osteoporotic fracture is 20%, this was previously 9.9  Reviewed treatment " options for increased frax. Reviewed fosamax, reclast, prolia. Reviewed uses, side effects, risks vs benefits  She would like to start fosamax  Recent CMP with PCP, will check vitamin d today.   Will repeat dexa 2025  Call with any negative side effects with fosamax, discussed how to take  Continue exercise, however recommend she speak with her  about neck pain, encouraged rest, heat, ice  Discussed proper dosing of calcium and vitamin D    Return if symptoms worsen or fail to improve, for Annual physical.    I spent 25 minutes caring for Kasia on this date of service. This time includes time spent by me in the following activities: preparing for the visit, reviewing tests, obtaining and/or reviewing a separately obtained history, counseling and educating the patient/family/caregiver, ordering medications, tests, or procedures, referring and communicating with other health care professionals, and documenting information in the medical record    Poonam Darling, APRN  6/13/2023  15:18 EDT

## 2023-06-14 LAB — 25(OH)D3+25(OH)D2 SERPL-MCNC: 58.9 NG/ML (ref 30–100)

## 2023-06-15 ENCOUNTER — TELEPHONE (OUTPATIENT)
Dept: FAMILY MEDICINE CLINIC | Facility: CLINIC | Age: 63
End: 2023-06-15

## 2023-06-15 NOTE — TELEPHONE ENCOUNTER
"Caller: Kasia Carroll \"Valencia\"    Relationship: Self    Best call back number: [phone]     What medication are you requesting: SOMETHING FOR UTI     What are your current symptoms: SENSATION OF HAVING TO GO     How long have you been experiencing symptoms: 1 MONTH     Have you had these symptoms before:    [x] Yes  [] No    Have you been treated for these symptoms before:   [x] Yes  [] No    If a prescription is needed, what is your preferred pharmacy and phone number: [id number] - [place], [place] - [address] [place] [address] AT [facility] [address] [place] [address]  - [phone]  - [phone]      Additional notes:        "

## 2023-06-16 NOTE — TELEPHONE ENCOUNTER
CALLED AND INFORMED PT WE RECOMMEND [facility] SINCE SCHEDULES ARE FULLY BOOKED FOR TODAY AND THE NEXT APPT IS NOT UNTIL MONDAY. PT VOICED UNDERSTANDING

## 2023-09-07 ENCOUNTER — OFFICE VISIT (OUTPATIENT)
Dept: FAMILY MEDICINE CLINIC | Facility: CLINIC | Age: 63
End: 2023-09-07
Payer: COMMERCIAL

## 2023-09-07 VITALS
BODY MASS INDEX: 21.73 KG/M2 | HEIGHT: 65 IN | SYSTOLIC BLOOD PRESSURE: 124 MMHG | RESPIRATION RATE: 18 BRPM | TEMPERATURE: 98.2 F | DIASTOLIC BLOOD PRESSURE: 74 MMHG | OXYGEN SATURATION: 96 % | WEIGHT: 130.4 LBS | HEART RATE: 89 BPM

## 2023-09-07 DIAGNOSIS — R10.30 LOWER ABDOMINAL PAIN: ICD-10-CM

## 2023-09-07 DIAGNOSIS — R30.0 DYSURIA: Primary | ICD-10-CM

## 2023-09-07 LAB
BILIRUB BLD-MCNC: ABNORMAL MG/DL
CLARITY, POC: CLEAR
COLOR UR: ABNORMAL
EXPIRATION DATE: ABNORMAL
GLUCOSE UR STRIP-MCNC: NEGATIVE MG/DL
KETONES UR QL: ABNORMAL
LEUKOCYTE EST, POC: NEGATIVE
Lab: ABNORMAL
NITRITE UR-MCNC: NEGATIVE MG/ML
PH UR: 7 [PH] (ref 5–8)
PROT UR STRIP-MCNC: ABNORMAL MG/DL
RBC # UR STRIP: ABNORMAL /UL
SP GR UR: 1.02 (ref 1–1.03)
UROBILINOGEN UR QL: NORMAL

## 2023-09-07 PROCEDURE — 81003 URINALYSIS AUTO W/O SCOPE: CPT | Performed by: NURSE PRACTITIONER

## 2023-09-07 PROCEDURE — 99213 OFFICE O/P EST LOW 20 MIN: CPT | Performed by: NURSE PRACTITIONER

## 2023-09-07 RX ORDER — NITROFURANTOIN 25; 75 MG/1; MG/1
CAPSULE ORAL
COMMUNITY
Start: 2023-08-02 | End: 2023-09-07

## 2023-09-07 RX ORDER — CIPROFLOXACIN 500 MG/1
500 TABLET, FILM COATED ORAL 2 TIMES DAILY
Qty: 14 TABLET | Refills: 0 | Status: SHIPPED | OUTPATIENT
Start: 2023-09-07

## 2023-09-07 NOTE — PROGRESS NOTES
Chief Complaint  Abdominal Pain (Karin while sitting down, burning. X 2months.) and Urinary Tract Infection (Frequency, burning)    Subjective        Kasia Carroll presents to [facility]  Abdominal Pain  This is a new problem. The current episode started more than 1 month ago. The onset quality is gradual. The problem occurs every several days. The most recent episode lasted 2 hours. The problem has been gradually worsening. The pain is located in the suprapubic region. The pain is at a severity of 5/10. The quality of the pain is aching and burning. The abdominal pain radiates to the suprapubic region. Associated symptoms include frequency. Pertinent negatives include no arthralgias, belching, constipation, diarrhea, dysuria, fever, flatus, headaches, hematochezia, hematuria, melena, myalgias, nausea, vomiting or weight loss. The pain is aggravated by certain positions. The pain is relieved by Activity and movement.   Kasia Carroll is a 63-year-old female who presents today for abdominal pain.    The patient reports she is doing well. She states her stomach has been bothering her. She describes it as a stinging sensation. She reports she notices it more when she is sitting in a car or watching TV. She states she walked 2.5 miles yesterday and did not have any pain. She denies burning with urination. She reports she just started to feel the sensation has gone to her vagina. She denies back pain, injuries, or constipation. She notes that she has a little gas. She states her  told her she does not eat enough. She reports she always has a smoothie with protein and fruit. She denies drinking a lot of caffeine. She states she drinks half caffeinated coffee in the morning, but she is going to switch to decaffeinated coffee. She denies straining to  her grandchildren. She denies numbness. She notes she has had 3 or 4 UTIs this year. She denies vaginal dryness.    She reports she  "had an ultrasound of her breast and it was normal. She states she had a bone density scan and kidneys checked. She notes both of her kidneys had shifted a little from 8 years ago. She reports she is taking Fosamax every Sunday. She states that she was given samples of Gemtesa. She reports she did not see a big difference when she ran out of the medication    Objective   Vital Signs:  /74 (BP Location: Right arm, Patient Position: Sitting, Cuff Size: Adult)   Pulse 89   Temp 98.2 °F (36.8 °C) (Temporal)   Resp 18   Ht 165.1 cm (65\")   Wt 59.1 kg (130 lb 6.4 oz)   SpO2 96%   BMI 21.70 kg/m²   Estimated body mass index is 21.7 kg/m² as calculated from the following:    Height as of this encounter: 165.1 cm (65\").    Weight as of this encounter: 59.1 kg (130 lb 6.4 oz).    BMI is within normal parameters. No other follow-up for BMI required.      Physical Exam  Vitals reviewed.   Constitutional:       Appearance: Normal appearance.   Cardiovascular:      Rate and Rhythm: Normal rate and regular rhythm.      Heart sounds: No murmur heard.    No friction rub. No gallop.   Pulmonary:      Effort: Pulmonary effort is normal. No respiratory distress.      Breath sounds: Normal breath sounds. No wheezing, rhonchi or rales.   Abdominal:      General: Bowel sounds are normal.      Palpations: Abdomen is soft.      Tenderness: There is abdominal tenderness (suprapubic).   Skin:     General: Skin is warm and dry.   Neurological:      Mental Status: She is alert and oriented to person, place, and time.   Psychiatric:         Mood and Affect: Mood normal.      Result Review :    UA          11/29/2022    11:38 4/11/2023    16:25 9/7/2023    15:47   Urinalysis   Ketones, UA Negative  Negative  40 mg/dL    Leukocytes, UA Negative  Negative  Negative      Urine Culture          4/11/2023    16:35 9/7/2023    16:17   Urine Culture   Urine Culture Final report  Final report                Assessment and Plan   Diagnoses and " all orders for this visit:    1. Dysuria (Primary)  -     POCT urinalysis dipstick, automated  -     Urine Culture - Urine, Urine, Clean Catch    Other orders  -     ciprofloxacin (Cipro) 500 MG tablet; Take 1 tablet by mouth 2 (Two) Times a Day.  Dispense: 14 tablet; Refill: 0      1. Dysuria  - A clean catch urine culture order has been placed.  - A prescription for Cipro 250 MG has been sent to the patient's pharmacy. She will take this for 7 days.    Addendum: urine culture is negative, given this is recurrent and urine has essentially been negative, will proceed with us of abdomen to ensure no acute abnormality, consider follow up gyn for continued symptoms       Follow Up   No follow-ups on file.  Patient was given instructions and counseling regarding her condition or for health maintenance advice. Please see specific information pulled into the AVS if appropriate.       Transcribed from ambient dictation for GENIE Duff by Sugey Myers.  09/07/23   15:40 EDT    Patient or patient representative verbalized consent to the visit recording.  I have personally performed the services described in this document as transcribed by the above individual, and it is both accurate and complete.    Answers submitted by the patient for this visit:  Primary Reason for Visit (Submitted on 9/6/2023)  What is the primary reason for your visit?: Abdominal Pain

## 2023-09-09 LAB
BACTERIA UR CULT: NORMAL
BACTERIA UR CULT: NORMAL

## 2023-09-26 ENCOUNTER — HOSPITAL ENCOUNTER (OUTPATIENT)
Dept: ULTRASOUND IMAGING | Facility: HOSPITAL | Age: 63
Discharge: HOME | End: 2023-09-26
Admitting: NURSE PRACTITIONER
Payer: COMMERCIAL

## 2023-09-26 DIAGNOSIS — R10.30 LOWER ABDOMINAL PAIN: ICD-10-CM

## 2023-09-26 PROCEDURE — 76857 US EXAM PELVIC LIMITED: CPT

## 2023-10-23 RX ORDER — LEVOTHYROXINE SODIUM 0.07 MG/1
75 TABLET ORAL DAILY
Qty: 90 TABLET | Refills: 1 | Status: SHIPPED | OUTPATIENT
Start: 2023-10-23

## 2023-10-23 RX ORDER — AMLODIPINE BESYLATE 5 MG/1
5 TABLET ORAL DAILY
Qty: 90 TABLET | Refills: 1 | Status: SHIPPED | OUTPATIENT
Start: 2023-10-23

## 2023-10-24 ENCOUNTER — OFFICE VISIT (OUTPATIENT)
Dept: FAMILY MEDICINE CLINIC | Facility: CLINIC | Age: 63
End: 2023-10-24
Payer: COMMERCIAL

## 2023-10-24 VITALS
SYSTOLIC BLOOD PRESSURE: 128 MMHG | OXYGEN SATURATION: 97 % | HEIGHT: 65 IN | HEART RATE: 105 BPM | RESPIRATION RATE: 18 BRPM | WEIGHT: 132.7 LBS | TEMPERATURE: 97.5 F | BODY MASS INDEX: 22.11 KG/M2 | DIASTOLIC BLOOD PRESSURE: 74 MMHG

## 2023-10-24 DIAGNOSIS — R10.31 RIGHT LOWER QUADRANT ABDOMINAL PAIN: Primary | ICD-10-CM

## 2023-10-24 LAB
BILIRUB BLD-MCNC: NEGATIVE MG/DL
CLARITY, POC: CLEAR
COLOR UR: YELLOW
EXPIRATION DATE: ABNORMAL
GLUCOSE UR STRIP-MCNC: ABNORMAL MG/DL
KETONES UR QL: ABNORMAL
LEUKOCYTE EST, POC: NEGATIVE
Lab: ABNORMAL
NITRITE UR-MCNC: NEGATIVE MG/ML
PH UR: 7 [PH] (ref 5–8)
PROT UR STRIP-MCNC: NEGATIVE MG/DL
RBC # UR STRIP: NEGATIVE /UL
SP GR UR: 1.01 (ref 1–1.03)
UROBILINOGEN UR QL: ABNORMAL

## 2023-10-24 PROCEDURE — 81003 URINALYSIS AUTO W/O SCOPE: CPT | Performed by: NURSE PRACTITIONER

## 2023-10-24 PROCEDURE — 99213 OFFICE O/P EST LOW 20 MIN: CPT | Performed by: NURSE PRACTITIONER

## 2023-10-24 RX ORDER — AMPICILLIN TRIHYDRATE 250 MG
1200 CAPSULE ORAL 2 TIMES DAILY
COMMUNITY

## 2023-10-24 NOTE — PROGRESS NOTES
"Chief Complaint  Urinary Tract Infection (Pain in pelvic area and rt side lower abdominal)    Subjective        Kasia Carroll presents to [facility]  History of Present Illness  Kasia Carroll is a 63-year-old female who presents today for urinary symptoms.    The patient states she is doing well. She reports she has been experiencing intermittent discomfort in her lower abdomen. She notes she had an ultrasound done and nothing came back.  She states she will occasionally feel pressure in her vagina. She denies burning when she urinates. She reports she has a colonoscopy scheduled. She states she has regular bowel movements. She denies constipation. She notes she has been taking Pepto-Bismol and it seems to help. She denies a history of diverticulosis.    Past surgeries include a hysterectomy. She is unsure if her ovaries were removed.      Objective   Vital Signs:  /74 (BP Location: Left arm, Patient Position: Sitting, Cuff Size: Adult)   Pulse 105   Temp 97.5 °F (36.4 °C) (Temporal)   Resp 18   Ht 165.1 cm (65\")   Wt 60.2 kg (132 lb 11.2 oz)   SpO2 97%   BMI 22.08 kg/m²   Estimated body mass index is 22.08 kg/m² as calculated from the following:    Height as of this encounter: 165.1 cm (65\").    Weight as of this encounter: 60.2 kg (132 lb 11.2 oz).    BMI is within normal parameters. No other follow-up for BMI required.      Physical Exam  Vitals reviewed.   Constitutional:       Appearance: Normal appearance.   Cardiovascular:      Rate and Rhythm: Normal rate and regular rhythm.      Heart sounds: No murmur heard.     No friction rub. No gallop.   Pulmonary:      Effort: Pulmonary effort is normal. No respiratory distress.      Breath sounds: Normal breath sounds. No wheezing, rhonchi or rales.   Skin:     General: Skin is warm and dry.   Neurological:      Mental Status: She is alert and oriented to person, place, and time.   Psychiatric:         Mood and Affect: Mood " normal.        Result Review :                  Assessment and Plan   Diagnoses and all orders for this visit:    1. Right lower quadrant abdominal pain (Primary)  -     POCT urinalysis dipstick, automated        1. Urinary symptoms  - The patient's urine is clear today.    2. Abdominal pain  - She complains of intermittent discomfort in her lower abdomen.  - She is currently taking Pepto-Bismol that seems to be helping. She was advised to continue taking it.       Follow Up   No follow-ups on file.  Patient was given instructions and counseling regarding her condition or for health maintenance advice. Please see specific information pulled into the AVS if appropriate.       Transcribed from ambient dictation for GENIE Duff by Sugey Myers.  10/24/23   14:21 EDT    Patient or patient representative verbalized consent to the visit recording.  I have personally performed the services described in this document as transcribed by the above individual, and it is both accurate and complete.      Answers submitted by the patient for this visit:  Primary Reason for Visit (Submitted on 10/23/2023)  What is the primary reason for your visit?: Abdominal Pain

## 2023-11-14 DIAGNOSIS — Z09 FOLLOW-UP EXAM, 3-6 MONTHS SINCE PREVIOUS EXAM: Primary | ICD-10-CM

## 2023-11-14 DIAGNOSIS — N63.10 MASS OF RIGHT BREAST, UNSPECIFIED QUADRANT: ICD-10-CM

## 2023-11-17 ENCOUNTER — TELEPHONE (OUTPATIENT)
Dept: OBSTETRICS AND GYNECOLOGY | Facility: CLINIC | Age: 63
End: 2023-11-17
Payer: COMMERCIAL

## 2023-11-17 NOTE — TELEPHONE ENCOUNTER
Pt worked with [facility] and scheduled her 6 Mth follow up DX Bilat Mammo & Right Limited US for 11/29/23 @ 11 & 1130am.

## 2023-11-29 ENCOUNTER — APPOINTMENT (OUTPATIENT)
Dept: WOMENS IMAGING | Facility: HOSPITAL | Age: 63
End: 2023-11-29
Payer: COMMERCIAL

## 2023-11-29 PROCEDURE — 76642 ULTRASOUND BREAST LIMITED: CPT | Performed by: RADIOLOGY

## 2023-11-29 PROCEDURE — G0279 TOMOSYNTHESIS, MAMMO: HCPCS | Performed by: RADIOLOGY

## 2023-11-29 PROCEDURE — 77066 DX MAMMO INCL CAD BI: CPT | Performed by: RADIOLOGY

## 2023-11-29 PROCEDURE — 77062 BREAST TOMOSYNTHESIS BI: CPT | Performed by: RADIOLOGY

## 2023-11-30 DIAGNOSIS — Z09 FOLLOW-UP EXAM, 3-6 MONTHS SINCE PREVIOUS EXAM: ICD-10-CM

## 2023-11-30 DIAGNOSIS — N63.10 MASS OF RIGHT BREAST, UNSPECIFIED QUADRANT: ICD-10-CM

## 2023-12-06 ENCOUNTER — OFFICE VISIT (OUTPATIENT)
Dept: FAMILY MEDICINE CLINIC | Facility: CLINIC | Age: 63
End: 2023-12-06
Payer: COMMERCIAL

## 2023-12-06 VITALS
WEIGHT: 136 LBS | RESPIRATION RATE: 14 BRPM | OXYGEN SATURATION: 98 % | TEMPERATURE: 97.8 F | BODY MASS INDEX: 22.66 KG/M2 | HEART RATE: 78 BPM | DIASTOLIC BLOOD PRESSURE: 80 MMHG | HEIGHT: 65 IN | SYSTOLIC BLOOD PRESSURE: 156 MMHG

## 2023-12-06 DIAGNOSIS — R10.30 LOWER ABDOMINAL PAIN: Primary | ICD-10-CM

## 2023-12-06 NOTE — PROGRESS NOTES
"Chief Complaint  GI Problem    Subjective        Kasia Carroll presents to [facility]  History of Present Illness  Kasia Carroll is a 63-year-old female who presents today for evaluation of abdominal pain.    The patient reports she is doing well. She continues to experience intermittent abdominal pain. It is not stabbing, but it is annoying. She had to cancel her colonoscopy, and she is on a waiting list. They could not get her until 02/2024. She denies seeing gastroenterology.    A mammogram was performed recently and was normal. No urinary symptoms, diarrhea, or constipation. Her bowel movements are regular. She would like to receive her influenza vaccine today.  Objective   Vital Signs:  /80   Pulse 78   Temp 97.8 °F (36.6 °C) (Temporal)   Resp 14   Ht 165.1 cm (65\")   Wt 61.7 kg (136 lb)   SpO2 98%   BMI 22.63 kg/m²   Estimated body mass index is 22.63 kg/m² as calculated from the following:    Height as of this encounter: 165.1 cm (65\").    Weight as of this encounter: 61.7 kg (136 lb).    BMI is within normal parameters. No other follow-up for BMI required.      Physical Exam  Constitutional:       General: She is not in acute distress.     Appearance: She is well-developed. She is not diaphoretic.   Cardiovascular:      Rate and Rhythm: Normal rate and regular rhythm.      Heart sounds: Normal heart sounds. No murmur heard.     No friction rub. No gallop.   Pulmonary:      Effort: Pulmonary effort is normal. No respiratory distress.      Breath sounds: Normal breath sounds. No wheezing or rales.   Abdominal:      General: Bowel sounds are normal. There is no distension.      Palpations: Abdomen is soft.      Tenderness: There is no abdominal tenderness.   Musculoskeletal:      Cervical back: Neck supple.   Skin:     General: Skin is warm and dry.   Neurological:      Mental Status: She is alert and oriented to person, place, and time.        Result Review :         "          Assessment and Plan   Diagnoses and all orders for this visit:    1. Lower abdominal pain (Primary)  -     Ambulatory Referral to Gastroenterology    Other orders  -     Fluzone >6 Months (5004-1902)      1. Lower abdominal pain (Primary)  -     Ambulatory referral to gastroenterology for further evaluation and treatment.    2. Vaccination  -     The patient will receive her influenza vaccine today.         Follow Up   No follow-ups on file.  Patient was given instructions and counseling regarding her condition or for health maintenance advice. Please see specific information pulled into the AVS if appropriate.       Transcribed from ambient dictation for GENIE Duff by Janny Nava.  12/06/23   13:57 EST    Patient or patient representative verbalized consent to the visit recording.  I have personally performed the services described in this document as transcribed by the above individual, and it is both accurate and complete.

## 2023-12-12 ENCOUNTER — OFFICE VISIT (OUTPATIENT)
Dept: FAMILY MEDICINE CLINIC | Facility: CLINIC | Age: 63
End: 2023-12-12
Payer: COMMERCIAL

## 2023-12-12 VITALS
WEIGHT: 136.7 LBS | TEMPERATURE: 96.9 F | HEART RATE: 91 BPM | SYSTOLIC BLOOD PRESSURE: 140 MMHG | BODY MASS INDEX: 22.78 KG/M2 | HEIGHT: 65 IN | OXYGEN SATURATION: 99 % | RESPIRATION RATE: 18 BRPM | DIASTOLIC BLOOD PRESSURE: 84 MMHG

## 2023-12-12 DIAGNOSIS — R73.09 ELEVATED GLUCOSE: ICD-10-CM

## 2023-12-12 DIAGNOSIS — I10 ESSENTIAL HYPERTENSION: ICD-10-CM

## 2023-12-12 DIAGNOSIS — E78.2 MIXED HYPERLIPIDEMIA: ICD-10-CM

## 2023-12-12 DIAGNOSIS — Z00.00 ANNUAL PHYSICAL EXAM: Primary | ICD-10-CM

## 2023-12-12 DIAGNOSIS — E03.9 ACQUIRED HYPOTHYROIDISM: ICD-10-CM

## 2023-12-12 PROCEDURE — 99396 PREV VISIT EST AGE 40-64: CPT | Performed by: NURSE PRACTITIONER

## 2023-12-12 NOTE — PROGRESS NOTES
"Chief Complaint  Annual Exam    Subjective        Kasia Carroll presents to [facility]  History of Present Illness  Valencia presents for an annual physical exam. She has no acute concerns today and reports a follow up with her specialist next month.       Objective   Vital Signs:  /84 (BP Location: Right arm, Patient Position: Sitting, Cuff Size: Adult)   Pulse 91   Temp 96.9 °F (36.1 °C) (Temporal)   Resp 18   Ht 165.1 cm (65\")   Wt 62 kg (136 lb 11.2 oz)   SpO2 99%   BMI 22.75 kg/m²   Estimated body mass index is 22.75 kg/m² as calculated from the following:    Height as of this encounter: 165.1 cm (65\").    Weight as of this encounter: 62 kg (136 lb 11.2 oz).       BMI is within normal parameters. No other follow-up for BMI required.      Physical Exam  Vitals reviewed.   Constitutional:       General: She is not in acute distress.     Appearance: Normal appearance. She is well-developed. She is not diaphoretic.   HENT:      Head: Normocephalic and atraumatic.      Right Ear: Tympanic membrane, ear canal and external ear normal.      Left Ear: Tympanic membrane, ear canal and external ear normal.      Nose: Nose normal.      Mouth/Throat:      Mouth: Mucous membranes are moist.      Pharynx: Oropharynx is clear. Uvula midline. No oropharyngeal exudate.   Eyes:      Conjunctiva/sclera: Conjunctivae normal.      Pupils: Pupils are equal, round, and reactive to light.   Cardiovascular:      Rate and Rhythm: Normal rate and regular rhythm.      Heart sounds: Normal heart sounds. No murmur heard.     No friction rub. No gallop.   Pulmonary:      Effort: Pulmonary effort is normal. No respiratory distress.      Breath sounds: Normal breath sounds. No wheezing or rales.   Abdominal:      General: Bowel sounds are normal. There is no distension.      Palpations: Abdomen is soft.      Tenderness: There is no abdominal tenderness.   Musculoskeletal:      Cervical back: Neck supple. "   Lymphadenopathy:      Cervical: No cervical adenopathy.   Skin:     General: Skin is warm and dry.   Neurological:      Mental Status: She is alert and oriented to person, place, and time.   Psychiatric:         Mood and Affect: Mood normal.        Result Review :                   Assessment and Plan   Diagnoses and all orders for this visit:    1. Annual physical exam (Primary)  -     CBC & Differential  -     Comprehensive Metabolic Panel  -     Lipid Panel With LDL / HDL Ratio  -     TSH Rfx On Abnormal To Free T4    2. Mixed hyperlipidemia  -     CBC & Differential  -     Comprehensive Metabolic Panel  -     Lipid Panel With LDL / HDL Ratio    3. Essential hypertension  -     CBC & Differential  -     Comprehensive Metabolic Panel    4. Acquired hypothyroidism  -     TSH Rfx On Abnormal To Free T4    5. Elevated glucose  -     Hemoglobin A1c        Information/counseling provided to the patient regarding periodic abimael maintenance recommendations, including but not limited to immunizations, diet/exercise/healthy lifestyle, laboratory, and other screenings. BMI is discussed. Appropriate exercise, diet, and weight plans are discussed.         Follow Up   No follow-ups on file.  Patient was given instructions and counseling regarding her condition or for health maintenance advice. Please see specific information pulled into the AVS if appropriate.

## 2023-12-13 ENCOUNTER — TELEPHONE (OUTPATIENT)
Dept: FAMILY MEDICINE CLINIC | Facility: CLINIC | Age: 63
End: 2023-12-13
Payer: COMMERCIAL

## 2023-12-13 LAB
ALBUMIN SERPL-MCNC: 4.8 G/DL (ref 3.5–5.2)
ALBUMIN/GLOB SERPL: 2 G/DL
ALP SERPL-CCNC: 36 U/L (ref 39–117)
ALT SERPL-CCNC: 22 U/L (ref 1–33)
AST SERPL-CCNC: 34 U/L (ref 1–32)
BASOPHILS # BLD AUTO: 0.01 10*3/MM3 (ref 0–0.2)
BASOPHILS NFR BLD AUTO: 0.2 % (ref 0–1.5)
BILIRUB SERPL-MCNC: 0.8 MG/DL (ref 0–1.2)
BUN SERPL-MCNC: 5 MG/DL (ref 8–23)
BUN/CREAT SERPL: 10 (ref 7–25)
CALCIUM SERPL-MCNC: 9.4 MG/DL (ref 8.6–10.5)
CHLORIDE SERPL-SCNC: 93 MMOL/L (ref 98–107)
CHOLEST SERPL-MCNC: 255 MG/DL (ref 0–200)
CO2 SERPL-SCNC: 26.3 MMOL/L (ref 22–29)
CREAT SERPL-MCNC: 0.5 MG/DL (ref 0.57–1)
EGFRCR SERPLBLD CKD-EPI 2021: 104.9 ML/MIN/1.73
EOSINOPHIL # BLD AUTO: 0.01 10*3/MM3 (ref 0–0.4)
EOSINOPHIL NFR BLD AUTO: 0.2 % (ref 0.3–6.2)
ERYTHROCYTE [DISTWIDTH] IN BLOOD BY AUTOMATED COUNT: 12.2 % (ref 12.3–15.4)
GLOBULIN SER CALC-MCNC: 2.4 GM/DL
GLUCOSE SERPL-MCNC: 125 MG/DL (ref 65–99)
HBA1C MFR BLD: 5 % (ref 4.8–5.6)
HCT VFR BLD AUTO: 40.7 % (ref 34–46.6)
HDLC SERPL-MCNC: 119 MG/DL (ref 40–60)
HGB BLD-MCNC: 14.1 G/DL (ref 12–15.9)
IMM GRANULOCYTES # BLD AUTO: 0.02 10*3/MM3 (ref 0–0.05)
IMM GRANULOCYTES NFR BLD AUTO: 0.4 % (ref 0–0.5)
LDLC SERPL CALC-MCNC: 128 MG/DL (ref 0–100)
LDLC/HDLC SERPL: 1.06 {RATIO}
LYMPHOCYTES # BLD AUTO: 0.43 10*3/MM3 (ref 0.7–3.1)
LYMPHOCYTES NFR BLD AUTO: 7.6 % (ref 19.6–45.3)
MCH RBC QN AUTO: 35.2 PG (ref 26.6–33)
MCHC RBC AUTO-ENTMCNC: 34.6 G/DL (ref 31.5–35.7)
MCV RBC AUTO: 101.5 FL (ref 79–97)
MONOCYTES # BLD AUTO: 0.57 10*3/MM3 (ref 0.1–0.9)
MONOCYTES NFR BLD AUTO: 10.1 % (ref 5–12)
NEUTROPHILS # BLD AUTO: 4.62 10*3/MM3 (ref 1.7–7)
NEUTROPHILS NFR BLD AUTO: 81.5 % (ref 42.7–76)
NRBC BLD AUTO-RTO: 0 /100 WBC (ref 0–0.2)
PLATELET # BLD AUTO: 170 10*3/MM3 (ref 140–450)
POTASSIUM SERPL-SCNC: 4.1 MMOL/L (ref 3.5–5.2)
PROT SERPL-MCNC: 7.2 G/DL (ref 6–8.5)
RBC # BLD AUTO: 4.01 10*6/MM3 (ref 3.77–5.28)
SODIUM SERPL-SCNC: 132 MMOL/L (ref 136–145)
TRIGL SERPL-MCNC: 48 MG/DL (ref 0–150)
TSH SERPL DL<=0.005 MIU/L-ACNC: 0.65 UIU/ML (ref 0.27–4.2)
VLDLC SERPL CALC-MCNC: 8 MG/DL (ref 5–40)
WBC # BLD AUTO: 5.66 10*3/MM3 (ref 3.4–10.8)

## 2023-12-13 NOTE — TELEPHONE ENCOUNTER
"Relay     \"Glucose is 125, due to nonfasting   Sodium is mildly decreased but stable   Total cholesterol is 255, normal is < 200, however nonfasting will increase those numbers   Hdl is in great range, LDL is improved, ratio is decreased which is good   Cbc shows likely reactionary levels to vaccine, very mild   Thyroid is normal \"      "

## 2023-12-13 NOTE — TELEPHONE ENCOUNTER
"Name: Kasia Carroll \"Valencia\"      Relationship: Self      Best Callback Number: [phone]      HUB PROVIDED THE RELAY MESSAGE FROM THE OFFICE      PATIENT: VOICED UNDERSTANDING AND HAS NO FURTHER QUESTIONS AT THIS TIME    ADDITIONAL INFORMATION:    "

## 2023-12-19 ENCOUNTER — OFFICE VISIT (OUTPATIENT)
Dept: GASTROENTEROLOGY | Facility: CLINIC | Age: 63
End: 2023-12-19
Payer: COMMERCIAL

## 2023-12-19 VITALS
DIASTOLIC BLOOD PRESSURE: 78 MMHG | WEIGHT: 129.6 LBS | SYSTOLIC BLOOD PRESSURE: 132 MMHG | BODY MASS INDEX: 21.59 KG/M2 | HEIGHT: 65 IN | HEART RATE: 82 BPM | TEMPERATURE: 97.6 F | OXYGEN SATURATION: 97 %

## 2023-12-19 DIAGNOSIS — R10.31 RIGHT LOWER QUADRANT ABDOMINAL PAIN: Primary | ICD-10-CM

## 2023-12-19 PROCEDURE — 99214 OFFICE O/P EST MOD 30 MIN: CPT | Performed by: NURSE PRACTITIONER

## 2023-12-19 RX ORDER — DICYCLOMINE HYDROCHLORIDE 10 MG/1
10 CAPSULE ORAL 3 TIMES DAILY PRN
Qty: 60 CAPSULE | Refills: 5 | Status: SHIPPED | OUTPATIENT
Start: 2023-12-19

## 2023-12-19 NOTE — PATIENT INSTRUCTIONS
Schedule CT scan for further evaluation.    For abdominal pain, may try IB Sean, available over the counter. Take two capsules once daily. Take 30 to 90 minutes before a meal with water. When in a flare, you may take up to two capsules, three times a day, for four weeks.     For abdominal pain, begin trial of dicyclomine as prescribed. Prescription sent to pharmacy.     For surveillance of colon polyps, colonoscopy recommended at 3-year interval, due March 2025.

## 2023-12-19 NOTE — PROGRESS NOTES
"Chief Complaint   Patient presents with    Abdominal Pain         History of Present Illness  Patient is a 63-year-old female who presents today for Evaluation, referred for lower abdominal pain.  She had a colonoscopy March 2022 with diverticulosis and 4 polyps.    Patient presents today with concerns about abdominal pain.  Pain has been located to the right lower quadrant.  It comes and goes but is present on a daily basis.  It started at September 2023 and has worsened, and is now occurring more frequently.  She describes it as a burning sensation.  It does not seem to relate to her bowel movements or anything in her diet.  She has had no fever.  She denies any nausea or vomiting.  She has found that Pepto-Bismol helps with the pain.    Reports regular bowel movements, denies any bowel changes.  Denies any blood in the stool.     Result Review :       CBC & Differential (12/12/2023 14:52)    Comprehensive Metabolic Panel (12/12/2023 14:52)    US Pelvis Limited (09/26/2023 09:49)    Tissue Pathology Exam (03/08/2022 12:11)    COLONOSCOPY (03/08/2022 12:00)    Office Visit with Bianca Cullen APRN (12/06/2023)    Referral to Gastroenterology for Lower abdominal pain (12/06/2023)    Vital Signs:   /78   Pulse 82   Temp 97.6 °F (36.4 °C)   Ht 165.1 cm (65\")   Wt 58.8 kg (129 lb 9.6 oz)   SpO2 97%   BMI 21.57 kg/m²     Body mass index is 21.57 kg/m².     Physical Exam  Vitals reviewed.   Constitutional:       General: She is not in acute distress.     Appearance: She is well-developed.   HENT:      Head: Normocephalic and atraumatic.   Pulmonary:      Effort: Pulmonary effort is normal. No respiratory distress.   Abdominal:      General: Abdomen is flat. Bowel sounds are normal. There is no distension.      Palpations: Abdomen is soft.      Tenderness: There is abdominal tenderness in the right lower quadrant.   Skin:     General: Skin is dry.      Coloration: Skin is not pale.   Neurological: "      Mental Status: She is alert and oriented to person, place, and time.   Psychiatric:         Thought Content: Thought content normal.           Assessment and Plan    Diagnoses and all orders for this visit:    1. Right lower quadrant abdominal pain (Primary)  -     CT Abdomen Pelvis With Contrast; Future    Other orders  -     dicyclomine (BENTYL) 10 MG capsule; Take 1 capsule by mouth 3 (Three) Times a Day As Needed (abdominal pain).  Dispense: 60 capsule; Refill: 5         Discussion  Patient presents today for evaluation with concerns about right lower quadrant abdominal pain.  Will schedule CT scan to evaluate for any evidence of bowel inflammation or appendiceal abnormality that could be contributing to her symptoms.  If CT negative and pain persists, may consider earlier colonoscopy, otherwise will be due March 2025 due to history of polyps.  Will provide prescription for dicyclomine to try for pain as needed while testing is being completed and also discussed trial of IBgard available over-the-counter.          Follow Up   Return for Follow up to review results after testing complete.    Patient Instructions   Schedule CT scan for further evaluation.    For abdominal pain, may try IB Sean, available over the counter. Take two capsules once daily. Take 30 to 90 minutes before a meal with water. When in a flare, you may take up to two capsules, three times a day, for four weeks.     For abdominal pain, begin trial of dicyclomine as prescribed. Prescription sent to pharmacy.     For surveillance of colon polyps, colonoscopy recommended at 3-year interval, due March 2025.

## 2023-12-28 ENCOUNTER — HOSPITAL ENCOUNTER (OUTPATIENT)
Dept: CT IMAGING | Facility: HOSPITAL | Age: 63
Discharge: HOME | End: 2023-12-28
Admitting: NURSE PRACTITIONER
Payer: COMMERCIAL

## 2023-12-28 DIAGNOSIS — R10.31 RIGHT LOWER QUADRANT ABDOMINAL PAIN: ICD-10-CM

## 2023-12-28 LAB — CREAT BLDA-MCNC: 0.5 MG/DL (ref 0.6–1.3)

## 2023-12-28 PROCEDURE — 25510000001 IOPAMIDOL 61 % SOLUTION: Performed by: NURSE PRACTITIONER

## 2023-12-28 PROCEDURE — 82565 ASSAY OF CREATININE: CPT

## 2023-12-28 PROCEDURE — 74177 CT ABD & PELVIS W/CONTRAST: CPT

## 2023-12-28 RX ADMIN — IOPAMIDOL 85 ML: 612 INJECTION, SOLUTION INTRAVENOUS at 12:01

## 2024-01-05 ENCOUNTER — TELEPHONE (OUTPATIENT)
Dept: GASTROENTEROLOGY | Facility: CLINIC | Age: 64
End: 2024-01-05

## 2024-01-16 ENCOUNTER — TELEPHONE (OUTPATIENT)
Dept: GASTROENTEROLOGY | Facility: CLINIC | Age: 64
End: 2024-01-16
Payer: COMMERCIAL

## 2024-01-16 NOTE — TELEPHONE ENCOUNTER
"Hub staff attempted to follow warm transfer process and was unsuccessful     Caller: Kasia Carroll \"Valencia\"    Relationship to patient: Self    Best call back number: [phone] (Mobile)     Patient is needing: PT ADVISED LAST CALL WITH POSSIBLY TEJAL FROM THE OFFICE SHE WAS ADVISED SHE NEEDED A COLONOSCOPY. PLEASE CALL TO SCHED PROCEDURE.  "

## 2024-01-19 NOTE — TELEPHONE ENCOUNTER
"    Hub staff attempted to follow warm transfer process and was unsuccessful     Caller: Kasia Carroll \"Valencia\"    Relationship to patient: Self    Best call back number: [phone]     Patient is needing: PT IS CALLING AGAIN TO TRY TO SCHEDULE A COLONOSCOPY. PLEASE CALL PT BACK TO SCHEDULE.        "

## 2024-01-23 NOTE — TELEPHONE ENCOUNTER
"Hub staff attempted to follow warm transfer process and was unsuccessful     Caller: Kasia Carroll \"Valencia\"    Relationship to patient: Self    Best call back number: [phone]     Patient is needing: PATIENT STATES THAT SHE IS TRYING SCHEDULE HER SCOPE AND KEEPS BEING ADVISED THAT SOMEONE WILL CALL HER BACK. SHE STATES THAT NO ONE HAS REACHED OUT. SHE STATES THAT SHE HAS BEEN SPEAKING WITH TEJAL AND WAS IF SHE WONDERING COULD HELP HER GET THIS SCHEDULED ASAP. PLEASE CALL BACK TO ADVISE.         "

## 2024-01-25 ENCOUNTER — PREP FOR SURGERY (OUTPATIENT)
Dept: SURGERY | Facility: SURGERY CENTER | Age: 64
End: 2024-01-25

## 2024-01-25 DIAGNOSIS — Z12.11 ENCOUNTER FOR SCREENING FOR MALIGNANT NEOPLASM OF COLON: ICD-10-CM

## 2024-01-25 DIAGNOSIS — R10.9 ABDOMINAL PAIN, UNSPECIFIED ABDOMINAL LOCATION: Primary | ICD-10-CM

## 2024-01-25 RX ORDER — SODIUM CHLORIDE, SODIUM LACTATE, POTASSIUM CHLORIDE, CALCIUM CHLORIDE 600; 310; 30; 20 MG/100ML; MG/100ML; MG/100ML; MG/100ML
30 INJECTION, SOLUTION INTRAVENOUS CONTINUOUS PRN
Status: CANCELLED | OUTPATIENT
Start: 2024-01-25

## 2024-01-25 RX ORDER — SODIUM CHLORIDE 0.9 % (FLUSH) 0.9 %
10 SYRINGE (ML) INJECTION AS NEEDED
Status: CANCELLED | OUTPATIENT
Start: 2024-01-25

## 2024-01-25 RX ORDER — SODIUM CHLORIDE 0.9 % (FLUSH) 0.9 %
3 SYRINGE (ML) INJECTION EVERY 12 HOURS SCHEDULED
Status: CANCELLED | OUTPATIENT
Start: 2024-01-25

## 2024-01-29 ENCOUNTER — TELEPHONE (OUTPATIENT)
Dept: OBSTETRICS AND GYNECOLOGY | Facility: CLINIC | Age: 64
End: 2024-01-29

## 2024-01-29 NOTE — TELEPHONE ENCOUNTER
Caller: CONNIE PRECIADO    Phone Number: [phone]    Reason for Call: SAME DAY CANCELLATION W/ GENIE BOO FOR ANNUAL @ 11:00am    REASON: PT IS SICK - WILL CALL BACK TO R/S

## 2024-01-30 PROBLEM — R10.9 ABDOMINAL PAIN: Status: ACTIVE | Noted: 2024-01-25

## 2024-01-30 PROBLEM — Z12.11 ENCOUNTER FOR SCREENING FOR MALIGNANT NEOPLASM OF COLON: Status: ACTIVE | Noted: 2024-01-25

## 2024-02-01 ENCOUNTER — ANESTHESIA (OUTPATIENT)
Dept: SURGERY | Facility: SURGERY CENTER | Age: 64
End: 2024-02-01
Payer: COMMERCIAL

## 2024-02-01 ENCOUNTER — ANESTHESIA EVENT (OUTPATIENT)
Dept: SURGERY | Facility: SURGERY CENTER | Age: 64
End: 2024-02-01
Payer: COMMERCIAL

## 2024-02-01 ENCOUNTER — HOSPITAL ENCOUNTER (OUTPATIENT)
Facility: SURGERY CENTER | Age: 64
Setting detail: HOSPITAL OUTPATIENT SURGERY
Discharge: HOME | End: 2024-02-01
Attending: INTERNAL MEDICINE | Admitting: INTERNAL MEDICINE
Payer: COMMERCIAL

## 2024-02-01 VITALS
OXYGEN SATURATION: 98 % | HEIGHT: 64 IN | WEIGHT: 126 LBS | BODY MASS INDEX: 21.51 KG/M2 | DIASTOLIC BLOOD PRESSURE: 86 MMHG | HEART RATE: 75 BPM | TEMPERATURE: 97.8 F | RESPIRATION RATE: 16 BRPM | SYSTOLIC BLOOD PRESSURE: 134 MMHG

## 2024-02-01 DIAGNOSIS — R10.9 ABDOMINAL PAIN, UNSPECIFIED ABDOMINAL LOCATION: ICD-10-CM

## 2024-02-01 DIAGNOSIS — Z12.11 ENCOUNTER FOR SCREENING FOR MALIGNANT NEOPLASM OF COLON: ICD-10-CM

## 2024-02-01 PROCEDURE — 25810000003 LACTATED RINGERS PER 1000 ML: Performed by: STUDENT IN AN ORGANIZED HEALTH CARE EDUCATION/TRAINING PROGRAM

## 2024-02-01 PROCEDURE — 25010000002 PROPOFOL 200 MG/20ML EMULSION: Performed by: STUDENT IN AN ORGANIZED HEALTH CARE EDUCATION/TRAINING PROGRAM

## 2024-02-01 PROCEDURE — 88305 TISSUE EXAM BY PATHOLOGIST: CPT | Performed by: INTERNAL MEDICINE

## 2024-02-01 PROCEDURE — 45380 COLONOSCOPY AND BIOPSY: CPT | Performed by: INTERNAL MEDICINE

## 2024-02-01 PROCEDURE — 25010000002 PROPOFOL 10 MG/ML EMULSION: Performed by: STUDENT IN AN ORGANIZED HEALTH CARE EDUCATION/TRAINING PROGRAM

## 2024-02-01 PROCEDURE — 25810000003 LACTATED RINGERS PER 1000 ML: Performed by: INTERNAL MEDICINE

## 2024-02-01 PROCEDURE — 45385 COLONOSCOPY W/LESION REMOVAL: CPT | Performed by: INTERNAL MEDICINE

## 2024-02-01 RX ORDER — LIDOCAINE HYDROCHLORIDE 20 MG/ML
INJECTION, SOLUTION EPIDURAL; INFILTRATION; INTRACAUDAL; PERINEURAL AS NEEDED
Status: DISCONTINUED | OUTPATIENT
Start: 2024-02-01 | End: 2024-02-01 | Stop reason: SURG

## 2024-02-01 RX ORDER — PROPOFOL 10 MG/ML
INJECTION, EMULSION INTRAVENOUS AS NEEDED
Status: DISCONTINUED | OUTPATIENT
Start: 2024-02-01 | End: 2024-02-01 | Stop reason: SURG

## 2024-02-01 RX ORDER — SODIUM CHLORIDE 0.9 % (FLUSH) 0.9 %
10 SYRINGE (ML) INJECTION AS NEEDED
Status: DISCONTINUED | OUTPATIENT
Start: 2024-02-01 | End: 2024-02-01 | Stop reason: HOSPADM

## 2024-02-01 RX ORDER — SODIUM CHLORIDE, SODIUM LACTATE, POTASSIUM CHLORIDE, CALCIUM CHLORIDE 600; 310; 30; 20 MG/100ML; MG/100ML; MG/100ML; MG/100ML
30 INJECTION, SOLUTION INTRAVENOUS CONTINUOUS PRN
Status: DISCONTINUED | OUTPATIENT
Start: 2024-02-01 | End: 2024-02-01 | Stop reason: HOSPADM

## 2024-02-01 RX ORDER — SODIUM CHLORIDE 0.9 % (FLUSH) 0.9 %
3 SYRINGE (ML) INJECTION EVERY 12 HOURS SCHEDULED
Status: DISCONTINUED | OUTPATIENT
Start: 2024-02-01 | End: 2024-02-01 | Stop reason: HOSPADM

## 2024-02-01 RX ORDER — MAGNESIUM HYDROXIDE 1200 MG/15ML
LIQUID ORAL AS NEEDED
Status: DISCONTINUED | OUTPATIENT
Start: 2024-02-01 | End: 2024-02-01 | Stop reason: HOSPADM

## 2024-02-01 RX ORDER — SODIUM CHLORIDE, SODIUM LACTATE, POTASSIUM CHLORIDE, CALCIUM CHLORIDE 600; 310; 30; 20 MG/100ML; MG/100ML; MG/100ML; MG/100ML
INJECTION, SOLUTION INTRAVENOUS CONTINUOUS PRN
Status: DISCONTINUED | OUTPATIENT
Start: 2024-02-01 | End: 2024-02-01 | Stop reason: SURG

## 2024-02-01 RX ADMIN — SODIUM CHLORIDE, SODIUM LACTATE, POTASSIUM CHLORIDE, AND CALCIUM CHLORIDE: .6; .31; .03; .02 INJECTION, SOLUTION INTRAVENOUS at 13:37

## 2024-02-01 RX ADMIN — PROPOFOL 100 MG: 10 INJECTION, EMULSION INTRAVENOUS at 13:40

## 2024-02-01 RX ADMIN — PROPOFOL 140 MCG/KG/MIN: 10 INJECTION, EMULSION INTRAVENOUS at 13:40

## 2024-02-01 RX ADMIN — LIDOCAINE HYDROCHLORIDE 30 MG: 20 INJECTION, SOLUTION EPIDURAL; INFILTRATION; INTRACAUDAL; PERINEURAL at 13:40

## 2024-02-01 RX ADMIN — SODIUM CHLORIDE, POTASSIUM CHLORIDE, SODIUM LACTATE AND CALCIUM CHLORIDE 30 ML/HR: 600; 310; 30; 20 INJECTION, SOLUTION INTRAVENOUS at 13:02

## 2024-02-01 NOTE — H&P
No chief complaint on file.      HPI  Patient a for screening colonoscopy.  She has a history of multiple colon polyps.         Problem List:    Patient Active Problem List   Diagnosis    Abnormal CAT scan    Breast calcification seen on mammogram    Esophagitis, reflux    Essential hypertension    Hypothyroidism (acquired)    Mixed hyperlipidemia    Floating ossicle of ankle    Acute pain of both knees    Numbness and tingling of both feet    Sprain of right ankle    Osteopenia    Encounter for screening colonoscopy    Encounter for screening for malignant neoplasm of colon    Abdominal pain       Medical History:    Past Medical History:   Diagnosis Date    Cataract     Disease of thyroid gland     History of postmenopausal HRT [id number]    Hyperlipidemia     About 6 months ago    Hypertension     Hypothyroidism     Multiple gestation 1985    Osteopenia 2023    Silicone leakage from breast implant     Urinary tract infection         Social History:    Social History     Socioeconomic History    Marital status:      Spouse name: MILLIE PRECIADO    Number of children: 2   Tobacco Use    Smoking status: Former     Packs/day: 0.50     Years: 10.00     Additional pack years: 0.00     Total pack years: 5.00     Types: Cigarettes     Quit date: 2013     Years since quittin.0    Smokeless tobacco: Never   Vaping Use    Vaping Use: Never used   Substance and Sexual Activity    Alcohol use: Yes     Alcohol/week: 10.0 standard drinks of alcohol     Types: 10 Cans of beer per week     Comment: BEER WEEKLY     Drug use: No    Sexual activity: Yes     Partners: Male     Birth control/protection: Post-menopausal       Family History:   Family History   Problem Relation Age of Onset    Hypertension Mother     Alzheimer's disease Mother     Thyroid disease Mother     Arthritis Mother     Prostate cancer Father     Cancer Father         prostrate    Hypertension Sister     Thyroid disease Sister      Colon cancer Neg Hx     Crohn's disease Neg Hx     Colon polyps Neg Hx     Irritable bowel syndrome Neg Hx     Ulcerative colitis Neg Hx        Surgical History:   Past Surgical History:   Procedure Laterality Date    BREAST AUGMENTATION  1989 2009, redone due to leakage.    BREAST CAPSULOTOMY, IMPLANT REVISION  2009    BREAST EXCISIONAL BIOPSY Right 2015    Dr. Bentley Patino, benign    BREAST SURGERY      CATARACT EXTRACTION      COLONOSCOPY      COLONOSCOPY N/A 03/08/2022    Procedure: COLONOSCOPY WITH POLYPECTOMY;  Surgeon: Gorge Rogers MD;  Location: [facility] MAIN OR;  Service: Gastroenterology;  Laterality: N/A;  polyps, diverticulosis    COSMETIC SURGERY  2009    eyelids lifted/forehead    EYE SURGERY  2004    Cataract    TOE AMPUTATION Left 1964    TONSILLECTOMY      WISDOM TOOTH EXTRACTION           Current Facility-Administered Medications:     lactated ringers infusion, 30 mL/hr, Intravenous, Continuous PRN, Gorge Rogers MD, Last Rate: 30 mL/hr at 02/01/24 1302, 30 mL/hr at 02/01/24 1302    sodium chloride 0.9 % flush 10 mL, 10 mL, Intravenous, PRN, Gorge Rogers MD    sodium chloride 0.9 % flush 3 mL, 3 mL, Intravenous, Q12H, Gorge Rogers MD    Allergies: No Known Allergies     The following portions of the patient's history were reviewed by me and updated as appropriate: review of systems, allergies, current medications, past family history, past medical history, past social history, past surgical history and problem list.    Vitals:    02/01/24 1255   BP: 162/96   Pulse: 77   Resp: 18   Temp: 97.8 °F (36.6 °C)   SpO2: 97%       PHYSICAL EXAM:    CONSTITUTIONAL:  today's vital signs reviewed by me  GASTROINTESTINAL: abdomen is soft nontender nondistended with normal active bowel sounds, no masses are appreciated    Assessment/ Plan  Will proceed today with colonoscopy.    Risks and benefits as well as alternatives to endoscopic evaluation were explained to the patient  and they voiced understanding and wish to proceed.  These risks include but are not limited to the risk of bleeding, perforation, adverse reaction to sedation, and missed lesions.  The patient was given the opportunity to ask questions prior to the endoscopic procedure.

## 2024-02-01 NOTE — ANESTHESIA PREPROCEDURE EVALUATION
Anesthesia Evaluation     Patient summary reviewed and Nursing notes reviewed   no history of anesthetic complications:   NPO Solid Status: > 8 hours  NPO Liquid Status: > 2 hours           Airway   Dental      Pulmonary    Cardiovascular     (+) hypertension      Neuro/Psych  GI/Hepatic/Renal/Endo      Musculoskeletal     Abdominal    Substance History      OB/GYN          Other                          Anesthesia Plan    ASA 2     MAC     intravenous induction     Anesthetic plan, risks, benefits, and alternatives have been provided, discussed and informed consent has been obtained with: patient.        CODE STATUS:

## 2024-02-01 NOTE — ANESTHESIA POSTPROCEDURE EVALUATION
Patient: Kasia Carroll    Procedure Summary       Date: 02/01/24 Room / Location[facility]    Anesthesia Start: 1337 Anesthesia Stop: 1400    Procedure: COLONOSCOPY to cecum with polpectomy Diagnosis:       Encounter for screening for malignant neoplasm of colon      Abdominal pain, unspecified abdominal location      (Encounter for screening for malignant neoplasm of colon [Z12.11])      (Abdominal pain, unspecified abdominal location [R10.9])    Surgeons: Gorge Rogers MD Provider: Michael Marcos MD    Anesthesia Type: MAC ASA Status: 2            Anesthesia Type: MAC    Vitals  Vitals Value Taken Time   /72 02/01/24 1402   Temp 36.6 °C (97.8 °F) 02/01/24 1400   Pulse 70 02/01/24 1402   Resp 16 02/01/24 1400   SpO2 97 % 02/01/24 1402   Vitals shown include unfiled device data.        Post Anesthesia Care and Evaluation    Patient location during evaluation: bedside  Patient participation: complete - patient participated  Level of consciousness: awake and alert  Pain management: adequate    Airway patency: patent  Anesthetic complications: No anesthetic complications  PONV Status: controlled  Cardiovascular status: blood pressure returned to baseline and acceptable  Respiratory status: acceptable  Hydration status: acceptable

## 2024-04-11 DIAGNOSIS — M85.80 OSTEOPENIA, UNSPECIFIED LOCATION: ICD-10-CM

## 2024-04-11 RX ORDER — AMLODIPINE BESYLATE 5 MG/1
5 TABLET ORAL DAILY
Qty: 90 TABLET | Refills: 1 | Status: SHIPPED | OUTPATIENT
Start: 2024-04-11

## 2024-04-11 RX ORDER — LEVOTHYROXINE SODIUM 0.07 MG/1
75 TABLET ORAL DAILY
Qty: 90 TABLET | Refills: 1 | Status: SHIPPED | OUTPATIENT
Start: 2024-04-11

## 2024-04-12 RX ORDER — ALENDRONATE SODIUM 70 MG/1
70 TABLET ORAL
Qty: 12 TABLET | Refills: 0 | Status: SHIPPED | OUTPATIENT
Start: 2024-04-12

## 2024-04-21 DIAGNOSIS — Z78.0 POSTMENOPAUSAL STATE: ICD-10-CM

## 2024-04-22 RX ORDER — ESTRADIOL 0.1 MG/G
CREAM VAGINAL
Qty: 42.5 G | Refills: 0 | Status: SHIPPED | OUTPATIENT
Start: 2024-04-22

## 2024-04-22 NOTE — TELEPHONE ENCOUNTER
Phong pt. Med refill. AE 11/29/22 last seen 6/13/23 osteopenia. [facility] pharmacy. Thank you

## 2024-05-22 DIAGNOSIS — Z78.0 POSTMENOPAUSAL STATE: ICD-10-CM

## 2024-05-22 NOTE — TELEPHONE ENCOUNTER
Med refill. Normally sees Poonam. AE 11/29/22, Mx 11/14/23, Dexa 5/3/23. Last seent 6/13/23 Osteopenia, unspecified location. Levi. Thank you

## 2024-05-23 RX ORDER — ESTRADIOL 0.1 MG/G
CREAM VAGINAL
Qty: 42.5 G | Refills: 0 | OUTPATIENT
Start: 2024-05-23

## 2024-05-23 NOTE — TELEPHONE ENCOUNTER
Via My Chart contacted Valencia to let her know that we were not able to fill her estradiol. She needs AE & Mx. Last seen for AE 11/29/2022.

## 2024-05-31 DIAGNOSIS — N63.10 MASS OF RIGHT BREAST, UNSPECIFIED QUADRANT: ICD-10-CM

## 2024-05-31 DIAGNOSIS — Z09 FOLLOW-UP EXAM, 3-6 MONTHS SINCE PREVIOUS EXAM: Primary | ICD-10-CM

## 2024-06-03 ENCOUNTER — TELEPHONE (OUTPATIENT)
Dept: OBSTETRICS AND GYNECOLOGY | Facility: CLINIC | Age: 64
End: 2024-06-03
Payer: COMMERCIAL

## 2024-06-03 NOTE — TELEPHONE ENCOUNTER
Pt worked with [facility] and scheduled her 6mth follow up [facility] [employer] [facility] for 6/11/24 @ 330pm.

## 2024-06-10 RX ORDER — DICYCLOMINE HYDROCHLORIDE 10 MG/1
10 CAPSULE ORAL 3 TIMES DAILY PRN
Qty: 60 CAPSULE | Refills: 5 | Status: SHIPPED | OUTPATIENT
Start: 2024-06-10

## 2024-06-11 ENCOUNTER — APPOINTMENT (OUTPATIENT)
Dept: WOMENS IMAGING | Facility: HOSPITAL | Age: 64
End: 2024-06-11
Payer: MEDICARE

## 2024-06-11 PROCEDURE — 76642 ULTRASOUND BREAST LIMITED: CPT | Performed by: RADIOLOGY

## 2024-06-13 ENCOUNTER — TELEPHONE (OUTPATIENT)
Dept: OBSTETRICS AND GYNECOLOGY | Facility: CLINIC | Age: 64
End: 2024-06-13
Payer: MEDICARE

## 2024-06-13 DIAGNOSIS — R92.8 OTHER ABNORMAL AND INCONCLUSIVE FINDINGS ON DIAGNOSTIC IMAGING OF BREAST: ICD-10-CM

## 2024-06-13 DIAGNOSIS — Z09 FOLLOW-UP EXAM, 3-6 MONTHS SINCE PREVIOUS EXAM: ICD-10-CM

## 2024-06-13 DIAGNOSIS — N63.10 MASS OF RIGHT BREAST, UNSPECIFIED QUADRANT: ICD-10-CM

## 2024-06-13 DIAGNOSIS — Z09 FOLLOW-UP EXAM, 3-6 MONTHS SINCE PREVIOUS EXAM: Primary | ICD-10-CM

## 2024-06-28 DIAGNOSIS — M85.80 OSTEOPENIA, UNSPECIFIED LOCATION: ICD-10-CM

## 2024-06-28 NOTE — TELEPHONE ENCOUNTER
Pt requesting fosamax refill.     Pharmacy confirmed -   [facility] [id number] - [facility] [address] AT [facility] [address] - [phone]  - [phone] FX        Thank you!

## 2024-07-01 RX ORDER — ALENDRONATE SODIUM 70 MG/1
70 TABLET ORAL
Qty: 12 TABLET | Refills: 3 | Status: SHIPPED | OUTPATIENT
Start: 2024-07-01

## 2024-08-10 DIAGNOSIS — Z78.0 POSTMENOPAUSAL STATE: ICD-10-CM

## 2024-08-12 RX ORDER — ESTRADIOL 0.1 MG/G
2 CREAM VAGINAL 2 TIMES WEEKLY
Qty: 42.5 G | Refills: 0 | Status: SHIPPED | OUTPATIENT
Start: 2024-08-12

## 2024-10-25 DIAGNOSIS — Z78.0 POSTMENOPAUSAL STATE: ICD-10-CM

## 2024-10-25 RX ORDER — ESTRADIOL 0.1 MG/G
CREAM VAGINAL
Qty: 42.5 G | Refills: 2 | Status: SHIPPED | OUTPATIENT
Start: 2024-10-25

## 2024-10-25 NOTE — TELEPHONE ENCOUNTER
Med refill. Last seen osteopenia 6/13/23. AE 11/29/24, Dx Mx 6/13/24, Dexa 5/3/23. She has canceled 11/30/23, 1/29/24, 2/29/24 and 4/18/24. Medicare. Levi. Thank you

## 2024-11-05 DIAGNOSIS — Z78.0 POSTMENOPAUSAL STATE: ICD-10-CM

## 2024-11-05 RX ORDER — ESTRADIOL 0.1 MG/G
CREAM VAGINAL
Qty: 42.5 G | Refills: 2 | OUTPATIENT
Start: 2024-11-05

## 2024-11-06 RX ORDER — DICYCLOMINE HYDROCHLORIDE 10 MG/1
10 CAPSULE ORAL 3 TIMES DAILY PRN
Qty: 60 CAPSULE | Refills: 5 | Status: SHIPPED | OUTPATIENT
Start: 2024-11-06

## 2024-11-06 RX ORDER — DICYCLOMINE HYDROCHLORIDE 10 MG/1
10 CAPSULE ORAL 3 TIMES DAILY PRN
Qty: 60 CAPSULE | Refills: 5 | Status: SHIPPED | OUTPATIENT
Start: 2024-11-06 | End: 2024-11-06 | Stop reason: SDUPTHER

## 2024-11-06 RX ORDER — LEVOTHYROXINE SODIUM 75 UG/1
75 TABLET ORAL DAILY
Qty: 90 TABLET | Refills: 1 | Status: SHIPPED | OUTPATIENT
Start: 2024-11-06

## 2024-11-06 RX ORDER — AMLODIPINE BESYLATE 5 MG/1
5 TABLET ORAL DAILY
Qty: 90 TABLET | Refills: 1 | Status: SHIPPED | OUTPATIENT
Start: 2024-11-06

## 2024-11-06 NOTE — TELEPHONE ENCOUNTER
Rx Refill Note  Requested Prescriptions     Pending Prescriptions Disp Refills    amLODIPine (NORVASC) 5 MG tablet 90 tablet 1     Sig: Take 1 tablet by mouth Daily.    levothyroxine (SYNTHROID, LEVOTHROID) 75 MCG tablet 90 tablet 1     Sig: Take 1 tablet by mouth Daily.      Last office visit with prescribing clinician: 12/12/2023   Last telemedicine visit with prescribing clinician: Visit date not found   Next office visit with prescribing clinician: 12/12/2024                         Would you like a call back once the refill request has been completed: [] Yes [] No    If the office needs to give you a call back, can they leave a voicemail: [] Yes [] No    Nilesh Pascal MA  11/06/24, 08:21 EST

## 2024-12-09 RX ORDER — DICYCLOMINE HYDROCHLORIDE 10 MG/1
10 CAPSULE ORAL 3 TIMES DAILY PRN
Qty: 60 CAPSULE | Refills: 5 | Status: SHIPPED | OUTPATIENT
Start: 2024-12-09

## 2025-02-05 DIAGNOSIS — M85.80 OSTEOPENIA, UNSPECIFIED LOCATION: ICD-10-CM

## 2025-02-05 RX ORDER — DICYCLOMINE HYDROCHLORIDE 10 MG/1
10 CAPSULE ORAL 3 TIMES DAILY PRN
Qty: 60 CAPSULE | Refills: 5 | OUTPATIENT
Start: 2025-02-05

## 2025-02-06 DIAGNOSIS — Z78.0 POSTMENOPAUSE: Primary | ICD-10-CM

## 2025-02-06 RX ORDER — ALENDRONATE SODIUM 70 MG/1
70 TABLET ORAL
Qty: 12 TABLET | Refills: 3 | Status: SHIPPED | OUTPATIENT
Start: 2025-02-06

## 2025-02-23 ENCOUNTER — APPOINTMENT (OUTPATIENT)
Dept: GENERAL RADIOLOGY | Facility: HOSPITAL | Age: 65
End: 2025-02-23
Payer: MEDICARE

## 2025-02-23 ENCOUNTER — HOSPITAL ENCOUNTER (EMERGENCY)
Facility: HOSPITAL | Age: 65
Discharge: HOME | End: 2025-02-23
Attending: EMERGENCY MEDICINE | Admitting: EMERGENCY MEDICINE
Payer: MEDICARE

## 2025-02-23 VITALS
RESPIRATION RATE: 18 BRPM | HEART RATE: 95 BPM | OXYGEN SATURATION: 98 % | BODY MASS INDEX: 21.34 KG/M2 | WEIGHT: 125 LBS | SYSTOLIC BLOOD PRESSURE: 134 MMHG | DIASTOLIC BLOOD PRESSURE: 83 MMHG | HEIGHT: 64 IN | TEMPERATURE: 97.2 F

## 2025-02-23 DIAGNOSIS — S42.92XA CLOSED FRACTURE OF LEFT SHOULDER, INITIAL ENCOUNTER: ICD-10-CM

## 2025-02-23 DIAGNOSIS — S42.215A CLOSED NONDISPLACED FRACTURE OF SURGICAL NECK OF LEFT HUMERUS, UNSPECIFIED FRACTURE MORPHOLOGY, INITIAL ENCOUNTER: Primary | ICD-10-CM

## 2025-02-23 PROCEDURE — 99283 EMERGENCY DEPT VISIT LOW MDM: CPT

## 2025-02-23 PROCEDURE — 73060 X-RAY EXAM OF HUMERUS: CPT

## 2025-02-23 RX ORDER — HYDROCODONE BITARTRATE AND ACETAMINOPHEN 5; 325 MG/1; MG/1
1 TABLET ORAL ONCE
Status: COMPLETED | OUTPATIENT
Start: 2025-02-23 | End: 2025-02-23

## 2025-02-23 RX ORDER — HYDROCODONE BITARTRATE AND ACETAMINOPHEN 5; 325 MG/1; MG/1
1 TABLET ORAL EVERY 4 HOURS PRN
Qty: 16 TABLET | Refills: 0 | Status: SHIPPED | OUTPATIENT
Start: 2025-02-23

## 2025-02-23 RX ADMIN — HYDROCODONE BITARTRATE AND ACETAMINOPHEN 1 TABLET: 5; 325 TABLET ORAL at 06:05

## 2025-02-23 NOTE — ED PROVIDER NOTES
EMERGENCY DEPARTMENT ENCOUNTER  Room Number:  HA1/A  PCP: Bianca Cullen APRN  Independent Historians: Patient and Family      HPI:  Chief Complaint: had concerns including Fall and Arm Injury.     A complete HPI/ROS/PMH/PSH/SH/FH are unobtainable due to: None    Chronic or social conditions impacting patient care (Social Determinants of Health): None      Context: Kasia Carroll is a 64 y.o. female with a medical history of HTN, hyperlipidemia who presents to the ED c/o acute left upper arm pain.  Patient states that she got up to go to the bathroom around 130 this morning.  When she went to get up from the toilet she fell forward.  She denies hitting her head.  States she mainly landed on her left shoulder.  Denies neck or back pain, numbness or tingling in her fingers.  She states that she has not been able to move her left arm since the fall.  She is right-hand dominant.      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    Prescription drug monitoring program review:     N/A    PAST MEDICAL HISTORY  Active Ambulatory Problems     Diagnosis Date Noted    Abnormal CAT scan 02/18/2016    Breast calcification seen on mammogram 02/18/2016    Esophagitis, reflux 02/18/2016    Essential hypertension 02/18/2016    Hypothyroidism (acquired) 02/18/2016    Mixed hyperlipidemia 03/27/2017    Floating ossicle of ankle 05/18/2017    Acute pain of both knees 12/27/2017    Numbness and tingling of both feet 05/02/2018    Sprain of right ankle 09/17/2018    Osteopenia 10/08/2018    Encounter for screening colonoscopy 11/22/2021    Encounter for screening for malignant neoplasm of colon 01/25/2024    Abdominal pain 01/25/2024     Resolved Ambulatory Problems     Diagnosis  Date Noted    Closed displaced fracture of lateral malleolus of right fibula 05/18/2017    Peroneal tendonitis of right lower leg 08/16/2017     Past Medical History:   Diagnosis Date    Cataract     Disease of thyroid gland     History of postmenopausal HRT [id number]    Hyperlipidemia     Hypertension     Hypothyroidism     Multiple gestation 1985    Silicone leakage from breast implant 2008    Urinary tract infection          PAST SURGICAL HISTORY  Past Surgical History:   Procedure Laterality Date    BREAST AUGMENTATION  1989 2009, redone due to leakage.    BREAST CAPSULOTOMY, IMPLANT REVISION  2009    BREAST EXCISIONAL BIOPSY Right 2015    Dr. Bentley Patino, benign    BREAST SURGERY      CATARACT EXTRACTION      COLONOSCOPY      COLONOSCOPY N/A 03/08/2022    Procedure: COLONOSCOPY WITH POLYPECTOMY;  Surgeon: Gorge Rogers MD;  [facility];  Service: Gastroenterology;  Laterality: N/A;  polyps, diverticulosis    COLONOSCOPY N/A 2/1/2024    Procedure: COLONOSCOPY to cecum with polpectomy;  Surgeon: Gorge Rogers MD;  [facility];  Service: Gastroenterology;  Laterality: N/A;  polyps, diverticulosis, hemorrhoids    COSMETIC SURGERY  2009    eyelids lifted/forehead    EYE SURGERY  2004    Cataract    TOE AMPUTATION Left 1964    TONSILLECTOMY      WISDOM TOOTH EXTRACTION           FAMILY HISTORY  Family History   Problem Relation Age of Onset    Hypertension Mother     Alzheimer's disease Mother     Thyroid disease Mother     Arthritis Mother     Prostate cancer Father     Cancer Father         prostrate    Hypertension Sister     Thyroid disease Sister     Colon cancer Neg Hx     Crohn's disease Neg Hx     Colon polyps Neg Hx     Irritable bowel syndrome Neg Hx     Ulcerative colitis Neg Hx          SOCIAL HISTORY  Social History     Socioeconomic History    Marital status:      Spouse name: MILLIE PRECIADO    Number of children: 2   Tobacco Use    Smoking  status: Former     Current packs/day: 0.00     Average packs/day: 0.5 packs/day for 10.0 years (5.0 ttl pk-yrs)     Types: Cigarettes     Start date: 2003     Quit date: 2013     Years since quittin.1    Smokeless tobacco: Never   Vaping Use    Vaping status: Never Used   Substance and Sexual Activity    Alcohol use: Yes     Alcohol/week: 10.0 standard drinks of alcohol     Types: 10 Cans of beer per week     Comment: BEER WEEKLY     Drug use: No    Sexual activity: Yes     Partners: Male     Birth control/protection: Post-menopausal         ALLERGIES  Patient has no known allergies.      REVIEW OF SYSTEMS  Review of Systems  Included in HPI  All systems reviewed and negative except for those discussed in HPI.      PHYSICAL EXAM    I have reviewed the triage vital signs and nursing notes.    ED Triage Vitals   Temp Heart Rate Resp BP SpO2   25 0246 25 0246 25 0246 25 0249 25 0246   97.2 °F (36.2 °C) 95 18 134/83 98 %       Physical Exam    GENERAL: Well appearing, nontoxic appearing, not distressed  HENT: normocephalic, atraumatic  EYES: no scleral icterus, PERRL  CV: regular rhythm, regular rate, no murmur  RESPIRATORY: normal effort, CTAB  ABDOMEN: soft   MUSCULOSKELETAL: no deformity  No cervical, thoracic or lumbar tenderness to palpation  Tenderness to left shoulder and proximal humerus decreased range of motion.  Not able to take left hand and touch right shoulder.  Good left hand .  Normal cap refill.  2+ left radial pulse.  NEURO: alert, moves all extremities, follows commands, mental status normal/baseline  SKIN: warm, dry, no rash   Psych: Appropriate mood and affect  Nursing notes and vital signs reviewed    Vital signs and nursing notes reviewed.                LAB RESULTS  No results found for this or any previous visit (from the past 24 hours).      RADIOLOGY  XR Humerus Left    Result Date: 2025  Patient: SAMUEL PRECIADO  Time Out: 05:13 Exam(s): XR  LEFT HUMERUS EXAM:   XR Left Humerus, 2 or More Views CLINICAL HISTORY:    Reason for exam: fall - arm injury. TECHNIQUE:   Frontal and lateral views of the left humerus. COMPARISON:   No relevant prior studies available. FINDINGS:   Bones joints: Fracture of the left humeral neck.  No dislocation.   Soft tissues:  Unremarkable. IMPRESSION:     Fracture of the left humeral neck.  No dislocation.    Electronically signed by David Escobar MD on 02-23-25 at 0513       MEDICATIONS GIVEN IN ER  Medications   HYDROcodone-acetaminophen (NORCO) 5-325 MG per tablet 1 tablet (1 tablet Oral Given 2/23/25 0605)         ORDERS PLACED DURING THIS VISIT:  Orders Placed This Encounter   Procedures    [facility] 03. Shoulder Immobilzer/Sling (); Left    XR Humerus Left         DIFFERENTIAL DIAGNOSIS:  Differential Diagnosis for Upper Extremity Problem/Injury include but are not limited to the following:    Contusion of the shoulder/arm/elbow/forearm/wrist/hand/digits  Dislocation of the shoulder/elbow/wrist/digits  Sprains of the shoulder/elbow/wrist/hand/digits  Fractures (open/closed) of the shoulder, humerus, radius, ulna, hand or digits  Laceration or abrasions        OUTPATIENT MEDICATION MANAGEMENT:  No current Epic-ordered facility-administered medications on file.     Current Outpatient Medications Ordered in Epic   Medication Sig Dispense Refill    alendronate (FOSAMAX) 70 MG tablet Take 1 tablet by mouth Every 7 (Seven) Days. 12 tablet 3    amLODIPine (NORVASC) 5 MG tablet Take 1 tablet by mouth Daily. 90 tablet 1    CBD (cannabidiol) oral oil Take  by mouth.      dicyclomine (BENTYL) 10 MG capsule Take 1 capsule by mouth 3 (Three) Times a Day As Needed (abdominal pain). 60 capsule 5    estradiol (ESTRACE) 0.1 MG/GM vaginal cream INSERT 2 GRAMS INTO THE VAGINA TWO TIMES A WEEK 42.5 g 2    HYDROcodone-acetaminophen (NORCO) 5-325 MG per tablet Take 1 tablet by mouth Every 4 (Four) Hours As Needed for Moderate  Pain. 16 tablet 0    levothyroxine (SYNTHROID, LEVOTHROID) 75 MCG tablet Take 1 tablet by mouth Daily. 90 tablet 1    Multiple Vitamins-Minerals (MULTIVITAMIN WITH MINERALS) tablet tablet Take 1 tablet by mouth Daily.      Probiotic Product (PROBIOTIC DAILY PO) Take  by mouth.      Red Yeast Rice 600 MG capsule Take 2 capsules by mouth 2 (Two) Times a Day.           PROCEDURES  Procedures      PROGRESS, DATA ANALYSIS, CONSULTS, AND MEDICAL DECISION MAKING  All labs have been independently interpreted by me.  All radiology studies have been reviewed by me. All EKG's have been independently viewed and interpreted by me.  Discussion below represents my analysis of pertinent findings related to patient's condition, differential diagnosis, treatment plan and final disposition        Clinical Scores:                  ED Course as of 02/23/25 0758   Sun Feb 23, 2025   0630 Radiology study left humerus x-ray independently interpreted by me and my findings are fracture of the left femoral neck.  See dictation for official radiology interpretation.   [MS]   0650 64-year-old female who fell getting up off the toilet around 130 this morning, left humerus x-ray shows a fracture of the left humeral neck.  I discussed with patient that we will put her in a sling and swath and give her orthopedic follow-up.  She will be sent home with pain control.  She was given strict return to ER precautions.  She verbalized understanding and is agreeable to this plan. [MS]   0655 Reviewed pt's history and workup with Dr. Smith.  After a bedside evaluation, he agrees with the plan of care.     [MS]      ED Course User Index  [MS] Lisa Fraga, APRN             AS OF 07:58 EST VITALS:    BP - 134/83  HR - 95  TEMP - 97.2 °F (36.2 °C) (Tympanic)  O2 SATS - 98%      DIAGNOSIS  Final diagnoses:   Closed fracture of left shoulder, initial encounter   Closed nondisplaced fracture of surgical neck of left humerus, unspecified fracture  morphology, initial encounter     Discussed plan for discharge, as there is no emergent indication for admission. Pt/family is agreeable and understands need for follow up and repeat testing.  Pt is aware that discharge does not mean that nothing is wrong but it indicates no emergency is present that requires admission and they must continue care with follow-up as given below or physician of their choice.   Patient/Family voiced understanding of above instructions.  Patient discharged in stable condition.    FOLLOW UP   Bianca Cullen, APRN  [address]  [address]  [facility] [id number] [phone]    Call in 1 day      Jas Lemon MD  [address]  [facility] [address]  [phone]    Call in 1 day        RX     Medication List        New Prescriptions      HYDROcodone-acetaminophen 5-325 MG per tablet  Commonly known as: NORCO  Take 1 tablet by mouth Every 4 (Four) Hours As Needed for Moderate Pain.               Where to Get Your Medications        These medications were sent to [facility] [id number] - [facility] [address] [facility]. - [phone] [facility] [phone]   [address] [address]      Phone: [phone]   HYDROcodone-acetaminophen 5-325 MG per tablet         Joseph report  reviewed.  Risks, benefits, alternatives discussed with patient.  Pt consents to treatment and agrees to follow up with PMD tomorrow for further care and any other prescriptions.         DISPOSITION  ED Disposition       ED Disposition   Discharge    Condition   Stable    Comment   --                Please note that portions of this document were completed with a voice recognition program.    Note Disclaimer: At [facility], we believe that sharing information builds trust and better relationships. You are receiving this note because you recently visited [facility]. It is possible you will see health information before a provider has talked  with you about it. This kind of information can be easy to misunderstand. To help you fully understand what it means for your health, we urge you to discuss this note with your provider.         Lisa Fraga, APRN  02/23/25 0759

## 2025-02-23 NOTE — DISCHARGE INSTRUCTIONS
Medications as ordered  Home to rest  Ice to painful areas for 20 minutes at a time, 4 times a day  Wear sling & swathe until follow up with orthopedic MD  Tylenol or Motrin as needed for mild-moderate pain-take as instructed on package  Follow up with orthopedic MD, call Monday to schedule an appointment  Return to er for any worsening or new concerns including increased pain or swelling

## 2025-02-23 NOTE — ED PROVIDER NOTES
MD ATTESTATION NOTE    SHARED VISIT: This visit was performed by BOTH a physician and an APC. The substantive portion of the medical decision making was performed by this attesting physician who made or approved the management plan and takes responsibility for patient management. All studies documented in the APC note (if performed) were independently interpreted by me.    The SERGIO and I have discussed this patient's history, physical exam, MDM, and treatment plan.  I have reviewed the documentation and personally had a face to face interaction with the patient. The attached note describes my personal findings.      Kasia Carroll is a 64 y.o. female who presents to the ED c/o acute fall at home.  Patient slipped while walking to the bathroom and struck her left arm on the ground.  Patient [place] any tingling or numbness did not strike her head is not anticoagulated.      On exam:  GENERAL: not distressed  HENT: nares patent  EYES: no scleral icterus  CV: regular rhythm, regular rate  RESPIRATORY: normal effort  ABDOMEN: soft  MUSCULOSKELETAL: no deformity palpation of the proximal left humerus neurovascular intact distally   NEURO: alert, moves all extremities, follows commands  SKIN: warm, dry    Labs  No results found for this or any previous visit (from the past 24 hours).    Radiology  No Radiology Exams Resulted Within Past 24 Hours    Medications given in the ED:  Medications   HYDROcodone-acetaminophen (NORCO) 5-325 MG per tablet 1 tablet (1 tablet Oral Given 2/23/25 0605)       Orders placed during this visit:  Orders Placed This Encounter   Procedures    [facility] 03. Shoulder Immobilzer/Sling (); Left    XR Humerus Left       Medical Decision Making:  ED Course as of 02/24/25 0418   Sun Feb 23, 2025   0630 Radiology study left humerus x-ray independently interpreted by me and my findings are fracture of the left femoral neck.  See dictation for official radiology interpretation.   [MS]   6640  64-year-old female who fell getting up off the toilet around 130 this morning, left humerus x-ray shows a fracture of the left humeral neck.  I discussed with patient that we will put her in a sling and swath and give her orthopedic follow-up.  She will be sent home with pain control.  She was given strict return to ER precautions.  She verbalized understanding and is agreeable to this plan. [MS]   0655 Reviewed pt's history and workup with Dr. Smith.  After a bedside evaluation, he agrees with the plan of care.     [MS]      ED Course User Index  [MS] Lisa Fraga, APRN       Clinical Scores:                                   Differential diagnosis:  Acute, sprain, strain, dislocation    Diagnosis  Final diagnoses:   Closed fracture of left shoulder, initial encounter   Closed nondisplaced fracture of surgical neck of left humerus, unspecified fracture morphology, initial encounter          Erasmo Smith MD  02/23/25 0702       Erasmo Smith MD  02/24/25 0418

## 2025-02-27 ENCOUNTER — OFFICE VISIT (OUTPATIENT)
Dept: FAMILY MEDICINE CLINIC | Facility: CLINIC | Age: 65
End: 2025-02-27
Payer: MEDICARE

## 2025-02-27 VITALS
HEIGHT: 64 IN | SYSTOLIC BLOOD PRESSURE: 112 MMHG | WEIGHT: 125 LBS | HEART RATE: 115 BPM | DIASTOLIC BLOOD PRESSURE: 78 MMHG | BODY MASS INDEX: 21.34 KG/M2 | OXYGEN SATURATION: 95 %

## 2025-02-27 DIAGNOSIS — E78.2 MIXED HYPERLIPIDEMIA: ICD-10-CM

## 2025-02-27 DIAGNOSIS — I10 ESSENTIAL HYPERTENSION: Primary | ICD-10-CM

## 2025-02-27 DIAGNOSIS — R29.6 FALLS: ICD-10-CM

## 2025-02-27 DIAGNOSIS — E03.9 HYPOTHYROIDISM (ACQUIRED): ICD-10-CM

## 2025-02-27 DIAGNOSIS — R41.3 MEMORY IMPAIRMENT: ICD-10-CM

## 2025-02-27 LAB
BILIRUB BLD-MCNC: ABNORMAL MG/DL
CLARITY, POC: CLEAR
COLOR UR: ABNORMAL
EXPIRATION DATE: ABNORMAL
GLUCOSE UR STRIP-MCNC: NEGATIVE MG/DL
KETONES UR QL: ABNORMAL
LEUKOCYTE EST, POC: NEGATIVE
Lab: ABNORMAL
NITRITE UR-MCNC: NEGATIVE MG/ML
PH UR: 5.5 [PH] (ref 5–8)
PROT UR STRIP-MCNC: ABNORMAL MG/DL
RBC # UR STRIP: NEGATIVE /UL
SP GR UR: 1.02 (ref 1–1.03)
UROBILINOGEN UR QL: ABNORMAL

## 2025-02-28 ENCOUNTER — LAB (OUTPATIENT)
Dept: LAB | Facility: HOSPITAL | Age: 65
End: 2025-02-28
Payer: MEDICARE

## 2025-02-28 LAB
ALBUMIN SERPL-MCNC: 4.3 G/DL (ref 3.5–5.2)
ALBUMIN/GLOB SERPL: 1.4 G/DL
ALP SERPL-CCNC: 36 U/L (ref 39–117)
ALT SERPL W P-5'-P-CCNC: 11 U/L (ref 1–33)
ANION GAP SERPL CALCULATED.3IONS-SCNC: 15.1 MMOL/L (ref 5–15)
AST SERPL-CCNC: 22 U/L (ref 1–32)
BASOPHILS # BLD AUTO: 0.02 10*3/MM3 (ref 0–0.2)
BASOPHILS NFR BLD AUTO: 0.3 % (ref 0–1.5)
BILIRUB SERPL-MCNC: 0.8 MG/DL (ref 0–1.2)
BUN SERPL-MCNC: 5 MG/DL (ref 8–23)
BUN/CREAT SERPL: 9.4 (ref 7–25)
CALCIUM SPEC-SCNC: 9.7 MG/DL (ref 8.6–10.5)
CHLORIDE SERPL-SCNC: 96 MMOL/L (ref 98–107)
CHOLEST SERPL-MCNC: 220 MG/DL (ref 0–200)
CO2 SERPL-SCNC: 24.9 MMOL/L (ref 22–29)
CREAT SERPL-MCNC: 0.53 MG/DL (ref 0.57–1)
DEPRECATED RDW RBC AUTO: 41.8 FL (ref 37–54)
EGFRCR SERPLBLD CKD-EPI 2021: 102.8 ML/MIN/1.73
EOSINOPHIL # BLD AUTO: 0.17 10*3/MM3 (ref 0–0.4)
EOSINOPHIL NFR BLD AUTO: 2.9 % (ref 0.3–6.2)
ERYTHROCYTE [DISTWIDTH] IN BLOOD BY AUTOMATED COUNT: 11.5 % (ref 12.3–15.4)
GLOBULIN UR ELPH-MCNC: 3 GM/DL
GLUCOSE SERPL-MCNC: 121 MG/DL (ref 65–99)
HCT VFR BLD AUTO: 37.6 % (ref 34–46.6)
HDLC SERPL-MCNC: 68 MG/DL (ref 40–60)
HGB BLD-MCNC: 13 G/DL (ref 12–15.9)
IMM GRANULOCYTES # BLD AUTO: 0.03 10*3/MM3 (ref 0–0.05)
IMM GRANULOCYTES NFR BLD AUTO: 0.5 % (ref 0–0.5)
LDLC SERPL CALC-MCNC: 133 MG/DL (ref 0–100)
LDLC/HDLC SERPL: 1.92 {RATIO}
LYMPHOCYTES # BLD AUTO: 1.03 10*3/MM3 (ref 0.7–3.1)
LYMPHOCYTES NFR BLD AUTO: 17.4 % (ref 19.6–45.3)
MCH RBC QN AUTO: 34.5 PG (ref 26.6–33)
MCHC RBC AUTO-ENTMCNC: 34.6 G/DL (ref 31.5–35.7)
MCV RBC AUTO: 99.7 FL (ref 79–97)
MONOCYTES # BLD AUTO: 0.66 10*3/MM3 (ref 0.1–0.9)
MONOCYTES NFR BLD AUTO: 11.1 % (ref 5–12)
NEUTROPHILS NFR BLD AUTO: 4.02 10*3/MM3 (ref 1.7–7)
NEUTROPHILS NFR BLD AUTO: 67.8 % (ref 42.7–76)
NRBC BLD AUTO-RTO: 0 /100 WBC (ref 0–0.2)
PLATELET # BLD AUTO: 237 10*3/MM3 (ref 140–450)
PMV BLD AUTO: 9.7 FL (ref 6–12)
POTASSIUM SERPL-SCNC: 3.8 MMOL/L (ref 3.5–5.2)
PROT SERPL-MCNC: 7.3 G/DL (ref 6–8.5)
RBC # BLD AUTO: 3.77 10*6/MM3 (ref 3.77–5.28)
SODIUM SERPL-SCNC: 136 MMOL/L (ref 136–145)
TRIGL SERPL-MCNC: 108 MG/DL (ref 0–150)
TSH SERPL DL<=0.05 MIU/L-ACNC: 1.96 UIU/ML (ref 0.27–4.2)
VIT B12 BLD-MCNC: 950 PG/ML (ref 211–946)
VLDLC SERPL-MCNC: 19 MG/DL (ref 5–40)
WBC NRBC COR # BLD AUTO: 5.93 10*3/MM3 (ref 3.4–10.8)

## 2025-02-28 PROCEDURE — 80061 LIPID PANEL: CPT | Performed by: NURSE PRACTITIONER

## 2025-02-28 PROCEDURE — 80053 COMPREHEN METABOLIC PANEL: CPT | Performed by: NURSE PRACTITIONER

## 2025-02-28 PROCEDURE — 84443 ASSAY THYROID STIM HORMONE: CPT | Performed by: NURSE PRACTITIONER

## 2025-02-28 PROCEDURE — 36415 COLL VENOUS BLD VENIPUNCTURE: CPT | Performed by: NURSE PRACTITIONER

## 2025-02-28 PROCEDURE — 85025 COMPLETE CBC W/AUTO DIFF WBC: CPT | Performed by: NURSE PRACTITIONER

## 2025-02-28 PROCEDURE — 82607 VITAMIN B-12: CPT | Performed by: NURSE PRACTITIONER

## 2025-03-02 NOTE — PROGRESS NOTES
Chief Complaint  Hospital Follow Up Visit    Subjective        Kasia Carroll presents to [facility]  History of Present Illness  History of Present Illness  The patient presents for evaluation of falls, memory issues, and shoulder pain.    History is reported by other person in the presence of the patient.  She experienced a fall at home while attempting to rise from the toilet, resulting in a forward tumble onto her shoulder. This incident occurred at 1:30 AM when she attempted to use the bathroom after being in bed for 2 to 3 hours. She had consumed a few beers earlier that evening. A similar incident happened a week prior, where she fell forward and sustained facial injuries, including a black eye and a busted lip. She reports no symptoms of depression but admits to feeling fatigued. She has been experiencing excessive sleepiness, often sleeping until late afternoon, and has a decreased appetite, consuming only 3 to 4 ounces of food per day. These symptoms have been present for approximately 9 months. She believes these factors may be contributing to her weakness and balance issues. She does not report any episodes of hypotension following bowel movements. She has not been engaging in physical activity for the past 6 to 9 months. She retired in 2011 and used to work out with a .    She also reports memory issues. She did not have a urinalysis performed during her recent hospital visit.    She has an upcoming shoulder surgery scheduled for Monday morning. She has been diligent about wearing her sling and avoiding arm movement. Her pain is well-managed, although she experiences discomfort upon movement. She has been experiencing constant pain since Sunday, which has gradually decreased, but it was constant on Sunday, Monday, and half of Tuesday.    SOCIAL HISTORY  The patient admits to drinking alcohol occasionally, including a few beers on the night of her recent  "fall.    FAMILY HISTORY  Her mother had Alzheimer's disease with symptoms starting early. Her sisters have thyroid issues.    MEDICATIONS  Amlodipine    Objective   Vital Signs:  /78 (BP Location: Right arm, Patient Position: Sitting, Cuff Size: Adult)   Pulse 115   Ht 162.6 cm (64\")   Wt 56.7 kg (125 lb)   SpO2 95%   BMI 21.46 kg/m²   Estimated body mass index is 21.46 kg/m² as calculated from the following:    Height as of this encounter: 162.6 cm (64\").    Weight as of this encounter: 56.7 kg (125 lb).       BMI is within normal parameters. No other follow-up for BMI required.      Physical Exam  Constitutional:       General: She is not in acute distress.     Appearance: She is well-developed. She is not diaphoretic.   Cardiovascular:      Rate and Rhythm: Normal rate and regular rhythm.      Heart sounds: Normal heart sounds. No murmur heard.     No friction rub. No gallop.   Pulmonary:      Effort: Pulmonary effort is normal. No respiratory distress.      Breath sounds: Normal breath sounds. No wheezing or rales.   Musculoskeletal:      Cervical back: Neck supple.   Skin:     General: Skin is warm and dry.   Neurological:      Mental Status: She is alert. She is disoriented.       Physical Exam  Lungs were auscultated.    Vital Signs  Blood pressure is normal. Weight loss of 11 pounds noted.    Result Review :          Results      Assessment & Plan  1. Recurrent falls.  Her blood pressure readings are within normal range today and were also stable during her recent ER visit. However, there has been a significant weight loss of approximately 11 pounds since her last visit. She is currently on amlodipine for blood pressure management. Her pain is well-managed, although she experiences discomfort upon movement. She has been experiencing constant pain since Sunday, which has gradually decreased. A comprehensive panel of labs will be ordered, including blood counts, glucose, kidney function, liver " function, thyroid, cholesterol, and B12 levels. A urinalysis will be conducted today. Blood pressure will be monitored in lying, sitting, and standing positions to assess for any orthostatic hypotension. She will return tomorrow for blood work. If necessary, a referral for physical therapy can be made to aid in strengthening.    2. Memory issues.  She reports some memory issues, which may be related to her current health status. A referral to a neurologist will be made for further evaluation and neuropsychological testing to assess cognitive function. The neurologist will likely conduct further tests, including B12 levels, as low B12 has been linked to dementia.    3. Shoulder pain.  She has a scheduled surgery on Monday morning to address her shoulder injury, which requires fixation with a plate and screws. She has been managing her pain well, but still experiences discomfort when moving her arm. Post-surgery, physical therapy will be considered to aid in strengthening and recovery.           Assessment and Plan     Diagnoses and all orders for this visit:    1. Essential hypertension (Primary)  -     CBC & Differential  -     Comprehensive Metabolic Panel    2. Mixed hyperlipidemia  -     CBC & Differential  -     Comprehensive Metabolic Panel  -     Lipid Panel With LDL / HDL Ratio    3. Hypothyroidism (acquired)  -     TSH Rfx On Abnormal To Free T4    4. Memory impairment  -     TSH Rfx On Abnormal To Free T4  -     Vitamin B12  -     POC Urinalysis Dipstick, Automated  -     Ambulatory Referral to Neurology    5. Falls  -     POC Urinalysis Dipstick, Automated             Follow Up     No follow-ups on file.  Patient was given instructions and counseling regarding her condition or for health maintenance advice. Please see specific information pulled into the AVS if appropriate.       Patient or patient representative verbalized consent for the use of Ambient Listening during the visit with  Bianca Cullen  APRN for chart documentation. 3/2/2025  15:08 EST

## 2025-03-05 DIAGNOSIS — S42.215A CLOSED NONDISPLACED FRACTURE OF SURGICAL NECK OF LEFT HUMERUS, UNSPECIFIED FRACTURE MORPHOLOGY, INITIAL ENCOUNTER: ICD-10-CM

## 2025-03-05 DIAGNOSIS — S42.92XA CLOSED FRACTURE OF LEFT SHOULDER, INITIAL ENCOUNTER: ICD-10-CM

## 2025-03-06 RX ORDER — HYDROCODONE BITARTRATE AND ACETAMINOPHEN 5; 325 MG/1; MG/1
1 TABLET ORAL EVERY 4 HOURS PRN
Qty: 16 TABLET | Refills: 0 | OUTPATIENT
Start: 2025-03-06

## 2025-03-06 NOTE — TELEPHONE ENCOUNTER
Left voicemail to inform patient that Lisa PRESCOTT is unable to refill prescription she is requesting and to contact her PCP or pain management.

## 2025-06-23 RX ORDER — DICYCLOMINE HYDROCHLORIDE 10 MG/1
CAPSULE ORAL
Qty: 60 CAPSULE | Refills: 5 | OUTPATIENT
Start: 2025-06-23

## 2025-06-24 DIAGNOSIS — Z78.0 POSTMENOPAUSAL STATE: ICD-10-CM

## 2025-06-24 RX ORDER — LEVOTHYROXINE SODIUM 75 UG/1
75 TABLET ORAL DAILY
Qty: 90 TABLET | Refills: 1 | Status: SHIPPED | OUTPATIENT
Start: 2025-06-24

## 2025-06-24 RX ORDER — ESTRADIOL 0.1 MG/G
2 CREAM VAGINAL 2 TIMES WEEKLY
Qty: 42.5 G | Refills: 0 | Status: SHIPPED | OUTPATIENT
Start: 2025-06-26

## 2025-06-24 RX ORDER — AMLODIPINE BESYLATE 5 MG/1
5 TABLET ORAL DAILY
Qty: 90 TABLET | Refills: 1 | Status: SHIPPED | OUTPATIENT
Start: 2025-06-24

## 2025-06-24 RX ORDER — DICYCLOMINE HYDROCHLORIDE 10 MG/1
10 CAPSULE ORAL 3 TIMES DAILY PRN
Qty: 60 CAPSULE | Refills: 5 | OUTPATIENT
Start: 2025-06-24

## 2025-06-25 ENCOUNTER — OFFICE VISIT (OUTPATIENT)
Dept: FAMILY MEDICINE CLINIC | Facility: CLINIC | Age: 65
End: 2025-06-25
Payer: MEDICARE

## 2025-06-25 VITALS
BODY MASS INDEX: 21.29 KG/M2 | OXYGEN SATURATION: 97 % | WEIGHT: 124.7 LBS | DIASTOLIC BLOOD PRESSURE: 82 MMHG | HEART RATE: 84 BPM | SYSTOLIC BLOOD PRESSURE: 136 MMHG | HEIGHT: 64 IN

## 2025-06-25 DIAGNOSIS — E78.2 MIXED HYPERLIPIDEMIA: ICD-10-CM

## 2025-06-25 DIAGNOSIS — Z00.00 INITIAL MEDICARE ANNUAL WELLNESS VISIT: Primary | ICD-10-CM

## 2025-06-25 DIAGNOSIS — R73.09 ELEVATED GLUCOSE: ICD-10-CM

## 2025-06-25 DIAGNOSIS — E03.9 HYPOTHYROIDISM (ACQUIRED): ICD-10-CM

## 2025-06-25 DIAGNOSIS — I10 ESSENTIAL HYPERTENSION: ICD-10-CM

## 2025-06-25 NOTE — ASSESSMENT & PLAN NOTE
Lipid abnormalities are stable    Plan:  Continue same medication/s without change.      Discussed medication dosage, use, side effects, and goals of treatment in detail.    Counseled patient on lifestyle modifications to help control hyperlipidemia.     Patient Treatment Goals:   LDL goal is less than 70    Followup in 6 months.    Orders:    Comprehensive metabolic panel    Lipid Panel With LDL / HDL Ratio

## 2025-06-25 NOTE — PROGRESS NOTES
Subjective   The ABCs of the Annual Wellness Visit  Medicare Wellness Visit      Kasia Carroll is a 65 y.o. patient who presents for a Medicare Wellness Visit.    The following portions of the patient's history were reviewed and   updated as appropriate: allergies, current medications, past family history, past medical history, past social history, past surgical history, and problem list.    Compared to one year ago, the patient's physical   health is the same.  Compared to one year ago, the patient's mental   health is the same.    Recent Hospitalizations:  She was not admitted to the hospital during the last year.     Current Medical Providers:  Patient Care Team:  Bianca Cullen APRN as PCP - General (Family Medicine)  Krystal Jenkins APRN as Nurse Practitioner (Nurse Practitioner)    Outpatient Medications Prior to Visit   Medication Sig Dispense Refill    amLODIPine (NORVASC) 5 MG tablet Take 1 tablet by mouth Daily. 90 tablet 1    dicyclomine (BENTYL) 10 MG capsule Take 1 capsule by mouth 3 (Three) Times a Day As Needed (abdominal pain). 60 capsule 5    [START ON 6/26/2025] estradiol (ESTRACE) 0.1 MG/GM vaginal cream Insert 2 g into the vagina 2 (Two) Times a Week. 42.5 g 0    levothyroxine (SYNTHROID, LEVOTHROID) 75 MCG tablet Take 1 tablet by mouth Daily. 90 tablet 1    alendronate (FOSAMAX) 70 MG tablet Take 1 tablet by mouth Every 7 (Seven) Days. 12 tablet 3    HYDROcodone-acetaminophen (NORCO) 5-325 MG per tablet Take 1 tablet by mouth Every 4 (Four) Hours As Needed for Moderate Pain. 16 tablet 0     No facility-administered medications prior to visit.     No opioid medication identified on active medication list. I have reviewed chart for other potential  high risk medication/s and harmful drug interactions in the elderly.      Aspirin is not on active medication list.  Aspirin use is not indicated based on review of current medical condition/s. Risk of harm outweighs potential benefits.   ".    Patient Active Problem List   Diagnosis    Abnormal CAT scan    Breast calcification seen on mammogram    Esophagitis, reflux    Essential hypertension    Hypothyroidism (acquired)    Mixed hyperlipidemia    Floating ossicle of ankle    Acute pain of both knees    Numbness and tingling of both feet    Sprain of right ankle    Osteopenia    Encounter for screening colonoscopy    Encounter for screening for malignant neoplasm of colon    Abdominal pain     Advance Care Planning Advance Directive is not on file.  ACP discussion was declined by the patient. Patient does not have an advance directive, information provided.            Objective   Vitals:    25 1424   BP: 136/82   BP Location: Right arm   Patient Position: Sitting   Cuff Size: Adult   Pulse: 84   SpO2: 97%   Weight: 56.6 kg (124 lb 11.2 oz)   Height: 162.6 cm (64\")       Estimated body mass index is 21.4 kg/m² as calculated from the following:    Height as of this encounter: 162.6 cm (64\").    Weight as of this encounter: 56.6 kg (124 lb 11.2 oz).    BMI is within normal parameters. No other follow-up for BMI required.           Does the patient have evidence of cognitive impairment? Yes                                                                                               Health  Risk Assessment    Smoking Status:  Social History     Tobacco Use   Smoking Status Former    Current packs/day: 0.00    Average packs/day: 0.5 packs/day for 10.0 years (5.0 ttl pk-yrs)    Types: Cigarettes    Start date: 2003    Quit date: 2013    Years since quittin.4   Smokeless Tobacco Never     Alcohol Consumption:  Social History     Substance and Sexual Activity   Alcohol Use Yes    Alcohol/week: 10.0 standard drinks of alcohol    Types: 10 Cans of beer per week    Comment: BEER WEEKLY        Fall Risk Screen  STEADI Fall Risk Assessment was completed, and patient is at HIGH risk for falls. Assessment completed on:2025    Depression " Screening   Little interest or pleasure in doing things? Not at all   Feeling down, depressed, or hopeless? Not at all   PHQ-2 Total Score 0      Health Habits and Functional and Cognitive Screenin/24/2025     3:27 PM   Functional & Cognitive Status   Do you have difficulty preparing food and eating? No   Do you have difficulty bathing yourself, getting dressed or grooming yourself? No   Do you have difficulty using the toilet? No   Do you have difficulty moving around from place to place? No   Do you have trouble with steps or getting out of a bed or a chair? No   Current Diet Well Balanced Diet   Dental Exam Up to date   Eye Exam Up to date   Exercise (times per week) 3 times per week   Current Exercises Include Walking   Do you need help using the phone?  No   Are you deaf or do you have serious difficulty hearing?  No   Do you need help to go to places out of walking distance? No   Do you need help shopping? No   Do you need help preparing meals?  No   Do you need help with housework?  No   Do you need help with laundry? No   Do you need help taking your medications? No   Do you need help managing money? No   Do you ever drive or ride in a car without wearing a seat belt? No   Have you felt unusual fatigue (could be tiredness), stress, anger or loneliness in the last month? No   Who do you live with? Spouse   If you need help, do you have trouble finding someone available to you? No   Have you been bothered in the last four weeks by sexual problems? No   Do you have difficulty concentrating, remembering or making decisions? No           Age-appropriate Screening Schedule:  Refer to the list below for future screening recommendations based on patient's age, sex and/or medical conditions. Orders for these recommended tests are listed in the plan section. The patient has been provided with a written plan.    Health Maintenance List  Health Maintenance   Topic Date Due    Pneumococcal Vaccine 50+ ( -  PCV) Never done    DXA SCAN  05/03/2025    COVID-19 Vaccine (6 - 2024-25 season) 12/25/2025 (Originally 9/1/2024)    INFLUENZA VACCINE  07/01/2025    MAMMOGRAM  11/29/2025    LIPID PANEL  02/28/2026    ANNUAL WELLNESS VISIT  06/25/2026    TDAP/TD VACCINES (2 - Td or Tdap) 05/01/2028    COLORECTAL CANCER SCREENING  02/01/2029    HEPATITIS C SCREENING  Completed    ZOSTER VACCINE  Completed                                                                                                                                                CMS Preventative Services Quick Reference  Risk Factors Identified During Encounter  Immunizations Discussed/Encouraged: Prevnar 20 (Pneumococcal 20-valent conjugate)    The above risks/problems have been discussed with the patient.  Pertinent information has been shared with the patient in the After Visit Summary.  An After Visit Summary and PPPS were made available to the patient.    Follow Up:   Next Medicare Wellness visit to be scheduled in 1 year.       Information/counseling provided to the patient regarding periodic abimael maintenance recommendations, including but not limited to immunizations, diet/exercise/healthy lifestyle, laboratory, and other screenings. BMI is discussed. Appropriate exercise, diet, and weight plans are discussed.    Memory concerns; referred previously to neurology, appointment was cancelled by patient in error, will speak to referral coordinator to help facilitate scheduling a new visit  Routine labs as scheduled today  Discussed prevnar vaccine, patient spouse reports she is utd, will notify us of date via [facility] [facility]  Assessment & Plan  Initial Medicare annual wellness visit         Essential hypertension  Hypertension is stable and controlled  Continue current treatment regimen.  Blood pressure will be reassessed in 6 months.    Orders:    Comprehensive metabolic panel    Lipid Panel With LDL / HDL Ratio    Mixed hyperlipidemia   Lipid abnormalities are  stable    Plan:  Continue same medication/s without change.      Discussed medication dosage, use, side effects, and goals of treatment in detail.    Counseled patient on lifestyle modifications to help control hyperlipidemia.     Patient Treatment Goals:   LDL goal is less than 70    Followup in 6 months.    Orders:    Comprehensive metabolic panel    Lipid Panel With LDL / HDL Ratio    Hypothyroidism (acquired)         Elevated glucose    Orders:    Hemoglobin A1c         Follow Up:   No follow-ups on file.

## 2025-06-25 NOTE — ASSESSMENT & PLAN NOTE
Hypertension is stable and controlled  Continue current treatment regimen.  Blood pressure will be reassessed in 6 months.    Orders:    Comprehensive metabolic panel    Lipid Panel With LDL / HDL Ratio

## 2025-06-26 LAB
ALBUMIN SERPL-MCNC: 4.7 G/DL (ref 3.9–4.9)
ALP SERPL-CCNC: 34 IU/L (ref 44–121)
ALT SERPL-CCNC: 15 IU/L (ref 0–32)
AST SERPL-CCNC: 28 IU/L (ref 0–40)
BILIRUB SERPL-MCNC: 0.7 MG/DL (ref 0–1.2)
BUN SERPL-MCNC: 7 MG/DL (ref 8–27)
BUN/CREAT SERPL: 13 (ref 12–28)
CALCIUM SERPL-MCNC: 9.5 MG/DL (ref 8.7–10.3)
CHLORIDE SERPL-SCNC: 93 MMOL/L (ref 96–106)
CHOLEST SERPL-MCNC: 253 MG/DL (ref 100–199)
CO2 SERPL-SCNC: 23 MMOL/L (ref 20–29)
CREAT SERPL-MCNC: 0.52 MG/DL (ref 0.57–1)
EGFRCR SERPLBLD CKD-EPI 2021: 102 ML/MIN/1.73
GLOBULIN SER CALC-MCNC: 2.5 G/DL (ref 1.5–4.5)
GLUCOSE SERPL-MCNC: 109 MG/DL (ref 70–99)
HBA1C MFR BLD: 5.4 % (ref 4.8–5.6)
HDLC SERPL-MCNC: 112 MG/DL
LDLC SERPL CALC-MCNC: 131 MG/DL (ref 0–99)
LDLC/HDLC SERPL: 1.2 RATIO (ref 0–3.2)
POTASSIUM SERPL-SCNC: 4.7 MMOL/L (ref 3.5–5.2)
PROT SERPL-MCNC: 7.2 G/DL (ref 6–8.5)
SODIUM SERPL-SCNC: 131 MMOL/L (ref 134–144)
TRIGL SERPL-MCNC: 64 MG/DL (ref 0–149)
VLDLC SERPL CALC-MCNC: 10 MG/DL (ref 5–40)

## 2025-08-28 ENCOUNTER — OFFICE VISIT (OUTPATIENT)
Dept: FAMILY MEDICINE CLINIC | Facility: CLINIC | Age: 65
End: 2025-08-28
Payer: MEDICARE

## 2025-08-28 VITALS
DIASTOLIC BLOOD PRESSURE: 74 MMHG | BODY MASS INDEX: 21.61 KG/M2 | OXYGEN SATURATION: 99 % | WEIGHT: 126.6 LBS | HEIGHT: 64 IN | SYSTOLIC BLOOD PRESSURE: 136 MMHG | HEART RATE: 76 BPM

## 2025-08-28 DIAGNOSIS — M54.2 NECK PAIN: Primary | ICD-10-CM

## 2025-08-28 DIAGNOSIS — R10.30 LOWER ABDOMINAL PAIN: ICD-10-CM

## 2025-08-28 PROCEDURE — 3078F DIAST BP <80 MM HG: CPT | Performed by: NURSE PRACTITIONER

## 2025-08-28 PROCEDURE — 99213 OFFICE O/P EST LOW 20 MIN: CPT | Performed by: NURSE PRACTITIONER

## 2025-08-28 PROCEDURE — 3075F SYST BP GE 130 - 139MM HG: CPT | Performed by: NURSE PRACTITIONER

## 2025-08-28 PROCEDURE — 1125F AMNT PAIN NOTED PAIN PRSNT: CPT | Performed by: NURSE PRACTITIONER

## 2025-08-28 RX ORDER — SACCHAROMYCES BOULARDII 250 MG
250 CAPSULE ORAL 2 TIMES DAILY
Qty: 60 CAPSULE | Refills: 1 | Status: SHIPPED | OUTPATIENT
Start: 2025-08-28

## (undated) DEVICE — SYRINGE, LUER SLIP, STERILE, 60ML: Brand: MEDLINE

## (undated) DEVICE — FLEX ADVANTAGE 1500CC: Brand: FLEX ADVANTAGE

## (undated) DEVICE — GOWN ,SIRUS,NONREINFORCED 3XL: Brand: MEDLINE

## (undated) DEVICE — KT ORCA ORCAPOD DISP STRL

## (undated) DEVICE — VIAL FORMLN CAP 10PCT 20ML

## (undated) DEVICE — SUREFIT, DUAL DISPERSIVE ELECTRODE, CONTACT QUALITY MONITOR: Brand: SUREFIT

## (undated) DEVICE — SINGLE-USE BIOPSY FORCEPS: Brand: RADIAL JAW 4

## (undated) DEVICE — ADAPT CLN SCPE ENDO PORPOISE BX/50 DISP

## (undated) DEVICE — Device

## (undated) DEVICE — LASSO POLYPECTOMY SNARE: Brand: LASSO

## (undated) DEVICE — TRAP POLYP 4CHAMBER

## (undated) DEVICE — CANN NASL CO2 TRULINK W/O2 A/

## (undated) DEVICE — CANN O2 ETCO2 FITS ALL CONN CO2 SMPL A/ 7IN DISP LF

## (undated) DEVICE — GOWN ISOL W/THUMB UNIV BLU BX/15

## (undated) DEVICE — SINGLE-USE POLYPECTOMY SNARE: Brand: SENSATION SHORT THROW